# Patient Record
Sex: FEMALE | Race: WHITE | NOT HISPANIC OR LATINO | Employment: OTHER | URBAN - METROPOLITAN AREA
[De-identification: names, ages, dates, MRNs, and addresses within clinical notes are randomized per-mention and may not be internally consistent; named-entity substitution may affect disease eponyms.]

---

## 2016-05-06 LAB — CRC RECOMMENDATION EXT: NORMAL

## 2020-09-27 ENCOUNTER — HOSPITAL ENCOUNTER (OUTPATIENT)
Dept: TELEMEDICINE | Facility: HOSPITAL | Age: 65
Discharge: HOME | End: 2020-09-27
Payer: COMMERCIAL

## 2020-09-27 ENCOUNTER — HOSPITAL ENCOUNTER (INPATIENT)
Facility: HOSPITAL | Age: 65
LOS: 4 days | Discharge: NURSING, REHAB OR LONG-TERM CARE | DRG: 064 | End: 2020-10-01
Attending: PSYCHIATRY & NEUROLOGY | Admitting: PSYCHIATRY & NEUROLOGY
Payer: COMMERCIAL

## 2020-09-27 DIAGNOSIS — I63.9 ISCHEMIC STROKE: ICD-10-CM

## 2020-09-27 DIAGNOSIS — E78.2 MIXED HYPERLIPIDEMIA: ICD-10-CM

## 2020-09-27 DIAGNOSIS — I63.411 EMBOLIC STROKE INVOLVING RIGHT MIDDLE CEREBRAL ARTERY: Primary | ICD-10-CM

## 2020-09-27 DIAGNOSIS — E11.8 CONTROLLED DIABETES MELLITUS TYPE 2 WITH COMPLICATIONS, UNSPECIFIED WHETHER LONG TERM INSULIN USE: ICD-10-CM

## 2020-09-27 PROBLEM — E11.9 DM II (DIABETES MELLITUS, TYPE II), CONTROLLED: Status: ACTIVE | Noted: 2020-09-27

## 2020-09-27 PROBLEM — G93.6 CYTOTOXIC CEREBRAL EDEMA: Status: ACTIVE | Noted: 2020-09-27

## 2020-09-27 PROBLEM — I48.0 PAF (PAROXYSMAL ATRIAL FIBRILLATION): Status: RESOLVED | Noted: 2020-09-27 | Resolved: 2020-09-27

## 2020-09-27 PROBLEM — I48.0 PAF (PAROXYSMAL ATRIAL FIBRILLATION): Status: ACTIVE | Noted: 2020-09-27

## 2020-09-27 LAB
ALBUMIN SERPL BCP-MCNC: 3.5 G/DL (ref 3.5–5.2)
ALP SERPL-CCNC: 110 U/L (ref 55–135)
ALT SERPL W/O P-5'-P-CCNC: 17 U/L (ref 10–44)
ANION GAP SERPL CALC-SCNC: 12 MMOL/L (ref 8–16)
AST SERPL-CCNC: 18 U/L (ref 10–40)
BASOPHILS # BLD AUTO: 0.03 K/UL (ref 0–0.2)
BASOPHILS NFR BLD: 0.3 % (ref 0–1.9)
BILIRUB SERPL-MCNC: 0.6 MG/DL (ref 0.1–1)
BUN SERPL-MCNC: 9 MG/DL (ref 8–23)
CALCIUM SERPL-MCNC: 8.9 MG/DL (ref 8.7–10.5)
CHLORIDE SERPL-SCNC: 108 MMOL/L (ref 95–110)
CHOLEST SERPL-MCNC: 155 MG/DL (ref 120–199)
CHOLEST/HDLC SERPL: 3.4 {RATIO} (ref 2–5)
CK MB SERPL-MCNC: 1 NG/ML (ref 0.1–6.5)
CK MB SERPL-RTO: 1.9 % (ref 0–5)
CK SERPL-CCNC: 54 U/L (ref 20–180)
CO2 SERPL-SCNC: 22 MMOL/L (ref 23–29)
CREAT SERPL-MCNC: 0.7 MG/DL (ref 0.5–1.4)
CTP QC/QA: YES
DIFFERENTIAL METHOD: ABNORMAL
EOSINOPHIL # BLD AUTO: 0 K/UL (ref 0–0.5)
EOSINOPHIL NFR BLD: 0.1 % (ref 0–8)
ERYTHROCYTE [DISTWIDTH] IN BLOOD BY AUTOMATED COUNT: 15.8 % (ref 11.5–14.5)
EST. GFR  (AFRICAN AMERICAN): >60 ML/MIN/1.73 M^2
EST. GFR  (NON AFRICAN AMERICAN): >60 ML/MIN/1.73 M^2
ESTIMATED AVG GLUCOSE: 140 MG/DL (ref 68–131)
GLUCOSE SERPL-MCNC: 146 MG/DL (ref 70–110)
GLUCOSE SERPL-MCNC: 154 MG/DL (ref 70–110)
HBA1C MFR BLD HPLC: 6.5 % (ref 4–5.6)
HCT VFR BLD AUTO: 42.5 % (ref 37–48.5)
HDLC SERPL-MCNC: 45 MG/DL (ref 40–75)
HDLC SERPL: 29 % (ref 20–50)
HGB BLD-MCNC: 12.5 G/DL (ref 12–16)
IMM GRANULOCYTES # BLD AUTO: 0.02 K/UL (ref 0–0.04)
IMM GRANULOCYTES NFR BLD AUTO: 0.2 % (ref 0–0.5)
INR PPP: 0.9 (ref 0.8–1.2)
LDLC SERPL CALC-MCNC: 96.2 MG/DL (ref 63–159)
LYMPHOCYTES # BLD AUTO: 1.3 K/UL (ref 1–4.8)
LYMPHOCYTES NFR BLD: 14.4 % (ref 18–48)
MCH RBC QN AUTO: 25.2 PG (ref 27–31)
MCHC RBC AUTO-ENTMCNC: 29.4 G/DL (ref 32–36)
MCV RBC AUTO: 86 FL (ref 82–98)
MONOCYTES # BLD AUTO: 0.5 K/UL (ref 0.3–1)
MONOCYTES NFR BLD: 5.9 % (ref 4–15)
NEUTROPHILS # BLD AUTO: 7.2 K/UL (ref 1.8–7.7)
NEUTROPHILS NFR BLD: 79.1 % (ref 38–73)
NONHDLC SERPL-MCNC: 110 MG/DL
NRBC BLD-RTO: 0 /100 WBC
PLATELET # BLD AUTO: 230 K/UL (ref 150–350)
PMV BLD AUTO: 11.7 FL (ref 9.2–12.9)
POCT GLUCOSE: 105 MG/DL (ref 70–110)
POCT GLUCOSE: 117 MG/DL (ref 70–110)
POTASSIUM SERPL-SCNC: 4 MMOL/L (ref 3.5–5.1)
PROT SERPL-MCNC: 6.7 G/DL (ref 6–8.4)
PROTHROMBIN TIME: 10.4 SEC (ref 9–12.5)
RBC # BLD AUTO: 4.97 M/UL (ref 4–5.4)
SARS-COV-2 RDRP RESP QL NAA+PROBE: NEGATIVE
SODIUM SERPL-SCNC: 142 MMOL/L (ref 136–145)
TRIGL SERPL-MCNC: 69 MG/DL (ref 30–150)
TROPONIN I SERPL DL<=0.01 NG/ML-MCNC: 0.02 NG/ML (ref 0–0.03)
TSH SERPL DL<=0.005 MIU/L-ACNC: 0.95 UIU/ML (ref 0.4–4)
WBC # BLD AUTO: 9.14 K/UL (ref 3.9–12.7)

## 2020-09-27 PROCEDURE — 82962 GLUCOSE BLOOD TEST: CPT

## 2020-09-27 PROCEDURE — 96374 THER/PROPH/DIAG INJ IV PUSH: CPT

## 2020-09-27 PROCEDURE — 85025 COMPLETE CBC W/AUTO DIFF WBC: CPT

## 2020-09-27 PROCEDURE — 99284 EMERGENCY DEPT VISIT MOD MDM: CPT | Mod: ,,, | Performed by: EMERGENCY MEDICINE

## 2020-09-27 PROCEDURE — 92610 EVALUATE SWALLOWING FUNCTION: CPT

## 2020-09-27 PROCEDURE — 84484 ASSAY OF TROPONIN QUANT: CPT

## 2020-09-27 PROCEDURE — U0002 COVID-19 LAB TEST NON-CDC: HCPCS | Performed by: EMERGENCY MEDICINE

## 2020-09-27 PROCEDURE — 63600175 PHARM REV CODE 636 W HCPCS: Performed by: EMERGENCY MEDICINE

## 2020-09-27 PROCEDURE — 99285 EMERGENCY DEPT VISIT HI MDM: CPT | Mod: 25

## 2020-09-27 PROCEDURE — 25000003 PHARM REV CODE 250: Performed by: NURSE PRACTITIONER

## 2020-09-27 PROCEDURE — 80061 LIPID PANEL: CPT

## 2020-09-27 PROCEDURE — 82550 ASSAY OF CK (CPK): CPT

## 2020-09-27 PROCEDURE — 99223 1ST HOSP IP/OBS HIGH 75: CPT | Mod: ,,, | Performed by: PSYCHIATRY & NEUROLOGY

## 2020-09-27 PROCEDURE — 97166 OT EVAL MOD COMPLEX 45 MIN: CPT

## 2020-09-27 PROCEDURE — 97162 PT EVAL MOD COMPLEX 30 MIN: CPT

## 2020-09-27 PROCEDURE — 97116 GAIT TRAINING THERAPY: CPT

## 2020-09-27 PROCEDURE — 97535 SELF CARE MNGMENT TRAINING: CPT

## 2020-09-27 PROCEDURE — 85610 PROTHROMBIN TIME: CPT

## 2020-09-27 PROCEDURE — 99223 PR INITIAL HOSPITAL CARE,LEVL III: ICD-10-PCS | Mod: ,,, | Performed by: PSYCHIATRY & NEUROLOGY

## 2020-09-27 PROCEDURE — 96376 TX/PRO/DX INJ SAME DRUG ADON: CPT

## 2020-09-27 PROCEDURE — 11000001 HC ACUTE MED/SURG PRIVATE ROOM

## 2020-09-27 PROCEDURE — 82553 CREATINE MB FRACTION: CPT

## 2020-09-27 PROCEDURE — 99284 PR EMERGENCY DEPT VISIT,LEVEL IV: ICD-10-PCS | Mod: ,,, | Performed by: EMERGENCY MEDICINE

## 2020-09-27 PROCEDURE — 84443 ASSAY THYROID STIM HORMONE: CPT

## 2020-09-27 PROCEDURE — 80053 COMPREHEN METABOLIC PANEL: CPT

## 2020-09-27 PROCEDURE — 83036 HEMOGLOBIN GLYCOSYLATED A1C: CPT

## 2020-09-27 PROCEDURE — 97802 MEDICAL NUTRITION INDIV IN: CPT

## 2020-09-27 RX ORDER — BISACODYL 10 MG
10 SUPPOSITORY, RECTAL RECTAL DAILY PRN
Status: DISCONTINUED | OUTPATIENT
Start: 2020-09-27 | End: 2020-10-01 | Stop reason: HOSPADM

## 2020-09-27 RX ORDER — ONDANSETRON 2 MG/ML
INJECTION INTRAMUSCULAR; INTRAVENOUS
Status: DISPENSED
Start: 2020-09-27 | End: 2020-09-27

## 2020-09-27 RX ORDER — IBUPROFEN 200 MG
16 TABLET ORAL
Status: DISCONTINUED | OUTPATIENT
Start: 2020-09-27 | End: 2020-10-01 | Stop reason: HOSPADM

## 2020-09-27 RX ORDER — IBUPROFEN 200 MG
24 TABLET ORAL
Status: DISCONTINUED | OUTPATIENT
Start: 2020-09-27 | End: 2020-10-01 | Stop reason: HOSPADM

## 2020-09-27 RX ORDER — ATORVASTATIN CALCIUM 20 MG/1
40 TABLET, FILM COATED ORAL DAILY
Status: DISCONTINUED | OUTPATIENT
Start: 2020-09-27 | End: 2020-10-01 | Stop reason: HOSPADM

## 2020-09-27 RX ORDER — ONDANSETRON 2 MG/ML
4 INJECTION INTRAMUSCULAR; INTRAVENOUS
Status: COMPLETED | OUTPATIENT
Start: 2020-09-27 | End: 2020-09-27

## 2020-09-27 RX ORDER — ONDANSETRON 8 MG/1
8 TABLET, ORALLY DISINTEGRATING ORAL EVERY 8 HOURS PRN
Status: DISCONTINUED | OUTPATIENT
Start: 2020-09-27 | End: 2020-10-01 | Stop reason: HOSPADM

## 2020-09-27 RX ORDER — SODIUM CHLORIDE 0.9 % (FLUSH) 0.9 %
10 SYRINGE (ML) INJECTION
Status: DISCONTINUED | OUTPATIENT
Start: 2020-09-27 | End: 2020-10-01 | Stop reason: HOSPADM

## 2020-09-27 RX ORDER — INSULIN ASPART 100 [IU]/ML
1-10 INJECTION, SOLUTION INTRAVENOUS; SUBCUTANEOUS
Status: DISCONTINUED | OUTPATIENT
Start: 2020-09-27 | End: 2020-10-01 | Stop reason: HOSPADM

## 2020-09-27 RX ORDER — METOCLOPRAMIDE HYDROCHLORIDE 5 MG/ML
INJECTION INTRAMUSCULAR; INTRAVENOUS
Status: DISPENSED
Start: 2020-09-27 | End: 2020-09-27

## 2020-09-27 RX ORDER — ASPIRIN 81 MG/1
81 TABLET ORAL DAILY
Status: DISCONTINUED | OUTPATIENT
Start: 2020-09-27 | End: 2020-10-01 | Stop reason: HOSPADM

## 2020-09-27 RX ORDER — ONDANSETRON 2 MG/ML
4 INJECTION INTRAMUSCULAR; INTRAVENOUS EVERY 12 HOURS PRN
Status: DISCONTINUED | OUTPATIENT
Start: 2020-09-27 | End: 2020-10-01 | Stop reason: HOSPADM

## 2020-09-27 RX ORDER — GLUCAGON 1 MG
1 KIT INJECTION
Status: DISCONTINUED | OUTPATIENT
Start: 2020-09-27 | End: 2020-10-01 | Stop reason: HOSPADM

## 2020-09-27 RX ORDER — ACETAMINOPHEN 325 MG/1
650 TABLET ORAL EVERY 6 HOURS PRN
Status: DISCONTINUED | OUTPATIENT
Start: 2020-09-27 | End: 2020-10-01 | Stop reason: HOSPADM

## 2020-09-27 RX ORDER — LABETALOL HYDROCHLORIDE 5 MG/ML
10 INJECTION, SOLUTION INTRAVENOUS EVERY 6 HOURS PRN
Status: DISCONTINUED | OUTPATIENT
Start: 2020-09-27 | End: 2020-10-01 | Stop reason: HOSPADM

## 2020-09-27 RX ADMIN — ACETAMINOPHEN 650 MG: 325 TABLET ORAL at 08:09

## 2020-09-27 RX ADMIN — ONDANSETRON 4 MG: 2 INJECTION, SOLUTION INTRAMUSCULAR; INTRAVENOUS at 04:09

## 2020-09-27 RX ADMIN — ONDANSETRON 8 MG: 8 TABLET, ORALLY DISINTEGRATING ORAL at 10:09

## 2020-09-27 RX ADMIN — ATORVASTATIN CALCIUM 40 MG: 20 TABLET, FILM COATED ORAL at 08:09

## 2020-09-27 RX ADMIN — ASPIRIN 81 MG: 81 TABLET, COATED ORAL at 09:09

## 2020-09-27 RX ADMIN — ONDANSETRON 4 MG: 2 INJECTION, SOLUTION INTRAMUSCULAR; INTRAVENOUS at 03:09

## 2020-09-27 NOTE — CONSULTS
[facility]  Vascular Neurology  Comprehensive Stroke Center  Tele-Consultation Note      Consults    Consulting Provider: LUCY BLANCO  Current Providers  No providers found    Patient Location: [facility] - TELEMEDICINE [facility] Emergency Department  Spoke hospital nurse at bedside with patient assisting consultant.     Patient information was obtained from patient and relative(s).         Assessment/Plan:     STROKE DOCUMENTATION     Acute Stroke Times:   Acute Stroke Times   Last Known Normal Date: 09/26/20  Last Known Normal Time: 1500  Stroke Team Arrival Time: 1153  CT Interpretation Time: 0025    NIH Scale:  Interval: baseline  1a. Level of Consciousness: 0-->Alert, keenly responsive  1b. LOC Questions: 0-->Answers both questions correctly  1c. LOC Commands: 0-->Performs both tasks correctly  2. Best Gaze: 0-->Normal  3. Visual: 2-->Complete hemianopia  4. Facial Palsy: 1-->Minor paralysis (flattened nasolabial fold, asymmetry on smiling)  5a. Motor Arm, Left: 1-->Drift, limb holds 90 (or 45) degrees, but drifts down before full 10 seconds, does not hit bed or other support  5b. Motor Arm, Right: 0-->No drift, limb holds 90 (or 45) degrees for full 10 secs  6a. Motor Leg, Left: 1-->Drift, leg falls by the end of the 5-sec period but does not hit bed  6b. Motor Leg, Right: 1-->Drift, leg falls by the end of the 5-sec period but does not hit bed  7. Limb Ataxia: 0-->Absent  8. Sensory: 0-->Normal, no sensory loss  9. Best Language: 0-->No aphasia, normal  10. Dysarthria: 1-->Mild-to-moderate dysarthria, patient slurs at least some words and, at worst, can be understood with some difficulty  11. Extinction and Inattention (formerly Neglect): 0-->No abnormality  Total (NIH Stroke Scale): 7     Modified Viper    [place] Coma Scale:    ABCD2 Score:    [id number] Score:   HAS -BLED Score:   ICH Score:   Hunt & Newell Classification:        Diagnoses:   Ischemic stroke  R MCA ischemic stroke:  Out of tPA window.  CTA performed but images not available.  Call me back if there is an LVO.  Otherwise, recs as bellow:    Antithrombotics for secondary stroke prevention: Antiplatelets: Aspirin: 81 mg daily    Statins for secondary stroke prevention and hyperlipidemia, if present:   Statins: Atorvastatin- 80 mg daily    Aggressive risk factor modification: DM, HLD     Rehab efforts: The patient has been evaluated by a stroke team provider and the therapy needs have been fully considered based off the presenting complaints and exam findings. The following therapy evaluations are needed: PT evaluate and treat, OT evaluate and treat, SLP evaluate and treat    Diagnostics ordered/pending: HgbA1C to assess blood glucose levels, Lipid Profile to assess cholesterol levels, MRI head without contrast to assess brain parenchyma, TTE to assess cardiac function/status     VTE prophylaxis: Enoxaparin 40 mg SQ every 24 hours    BP parameters: Infarct: No intervention, SBP <220    IVFs: NS @ 75cc/hr        There were no vitals taken for this visit.  Alteplase Eligible?: No  Alteplase Recommendation: Alteplase not recommended due to Outside of treatment window   Possible Interventional Revascularization Candidate? Yes    Disposition Recommendation: pending further studies    Subjective:     History of Present Illness:  63 y/o WF with slurred speech and difficulty swallowing with L sided weakness.  Last seen nl at 3p. She was in bed most of the day yesterday.  Noted to have trouble with gait around 7:50p.  She awoke with L sided symptoms later in the evening.       Woke up with symptoms?: yes    Recent bleeding noted: no  Does the patient take any Blood Thinners? no  Medications: No relevant medications      Past Medical History:  diabetes and cervical stenosis;hypothyroidism; PAF    Past Surgical History: no major surgeries within the last 2 weeks    Family History: no  relevant history    Social History: no smoking, no drinking, no drugs    Allergies: Allergies have not been reviewed codeine    Review of Systems   All other systems reviewed and are negative.    Objective:   Vitals: There were no vitals taken for this visit. 167/69    CT READ: Yes  Abnormal CT R MCA infarct; hyperdense RMCA.     Physical Exam  Vitals signs reviewed.               Recommended the emergency room physician to have a brief discussion with the patient and/or family if available regarding the risks and benefits of treatment, and to briefly document the occurrence of that discussion in his clinical encounter note.     The attending portion of this evaluation, treatment, and documentation was performed per Haroldo Maguire MD via audiovisual.    Billing code:  (time dependent stroke, complex case, unstable patient, hemorrhages, any intervention, some mimics)    · This patient has a very critical neurological condition/illness, with very high morbidity and mortality.  · There is a very high probability for acute neurological change leading to clinical and possibly life-threatening deterioration requiring highest level of physician preparedness for urgent intervention.  · There is possibility that this condition will require treatment with high risk medications as quickly as possible.  · There is also a possibility that the patient may benefit from further, more advance complex therapies (e.g. endovascular therapy) that will require prompt diagnosis and care.  · Care was coordinated with other physicians involved in the patient's care.  · Radiologic studies and laboratory data were reviewed and interpreted, and plan of care was re-assessed based on the results.  · Diagnosis, treatment options and prognosis may have been discussed with the patient and/or family members or caregiver.  · Further advanced medical management and further evaluation is warranted for her care.      In your opinion, this was  a: Tier 2 Van Positive    Consult End Time: 12:26 AM     Haroldo Maguire MD  Comprehensive Stroke Center  Vascular Neurology   [facility]

## 2020-09-27 NOTE — H&P
[facility]  Vascular Neurology  Comprehensive Stroke Center  History & Physical    Consults  Assessment/Plan:     * Embolic stroke involving right middle cerebral artery  Su Dent is a 64 y.o. female with significant medical history of HLD, DM II presented to hospital as a transfer from [facility] for evaluation of R MCA stroke.  tPA not administered due to the patient presenting outside of the treatment window.  Imaging at the OSH revealed findings concerning for hyperdense R MCA sign.  On arrival to this facility the patient had dysarthria and right gaze preference.  She was also noted to have a right facial droop and mild right sided drift.  No endovascular intervention at this time.    Antithrombotics for secondary stroke prevention: Antiplatelets: Aspirin: 81 mg daily    Statins for secondary stroke prevention and hyperlipidemia, if present:   Statins: Atorvastatin- 80 mg daily    Aggressive risk factor modification: DM, HLD, thyroid disease     Rehab efforts: The patient has been evaluated by a stroke team provider and the therapy needs have been fully considered based off the presenting complaints and exam findings. The following therapy evaluations are needed: PT evaluate and treat, OT evaluate and treat, SLP evaluate and treat    Diagnostics ordered/pending: Carotid ultrasound to assess vasculature, HgbA1C to assess blood glucose levels, Lipid Profile to assess cholesterol levels, MRI head without contrast to assess brain parenchyma, TTE to assess cardiac function/status , TSH to assess thyroid function    VTE prophylaxis: Heparin 5000 units SQ every 8 hours  Mechanical prophylaxis: Place SCDs    BP parameters: Infarct: No intervention, SBP <220        Cytotoxic cerebral edema  -Small area of cytotoxic cerebral edema identified when reviewing brain imaging in the territory of the R middle cerebral artery. There is no mass effect associated with it. We will continue  to monitor the patients clinical exam for any worsening of symptoms which may indicate expansion of the stroke or the area of the edema resulting in the clinical change. The pattern is suggestive of cardioembolic etiology.        Mixed hyperlipidemia  -Stroke risk factor.  LDL pending.  -Atorvastatin 80 mg daily.    DM II (diabetes mellitus, type II), controlled  -Stroke risk factor.  A1c pending.    -Goal glucose 140-180 for now.  -Hold home meds for now.  -Moderate correction SSI and QAC/HS accuchecks, titrate prn        STROKE DOCUMENTATION     Acute Stroke Times   Symptom Onset Date: 09/26/20  Symptom Onset Time: 2215    NIH Scale:  Interval: baseline  1a. Level of Consciousness: 0-->Alert, keenly responsive  1b. LOC Questions: 0-->Answers both questions correctly  1c. LOC Commands: 0-->Performs both tasks correctly  2. Best Gaze: 1-->Partial gaze palsy, gaze is abnormal in one or both eyes, but forced deviation or total gaze paresis is not present  3. Visual: 0-->No visual loss  4. Facial Palsy: 1-->Minor paralysis (flattened nasolabial fold, asymmetry on smiling)  5a. Motor Arm, Left: 0-->No drift, limb holds 90 (or 45) degrees for full 10 secs  5b. Motor Arm, Right: 1-->Drift, limb holds 90 (or 45) degrees, but drifts down before full 10 secs, does not hit bed or other support  6a. Motor Leg, Left: 0-->No drift, leg holds 30 degree position for full 5 secs  6b. Motor Leg, Right: 1-->Drift, leg falls by the end of the 5-sec period but does not hit bed  7. Limb Ataxia: 0-->Absent  8. Sensory: 0-->Normal, no sensory loss  9. Best Language: 0-->No aphasia, normal  10. Dysarthria: 1-->Mild-to-moderate dysarthria, patient slurs at least some words and, at worst, can be understood with some difficulty  11. Extinction and Inattention (formerly Neglect): 0-->No abnormality  Total (NIH Stroke Scale): 5     Modified [place] Score: 1  Ambrose Coma Scale:15   ABCD2 Score:    [id number] Score:   HAS -BLED Score:   ICH  "Score:   Hunt & Newell Classification:      Thrombolysis Candidate? No, Out of window     Delays to Thrombolysis?  No    Interventional Revascularization Candidate?   Is the patient eligible for mechanical endovascular reperfusion (MITZY)?  No;  Large core infarct    Hemorrhagic change of an Ischemic Stroke: Does this patient have an ischemic stroke with hemorrhagic changes? No         Subjective:     History of Present Illness:  Su Dent is a 64 y.o. female with significant medical history of HLD, DM II presented to hospital as a transfer from [facility] for evaluation of R MCA stroke.  Patient was LKN around 1500 on 09/26/2020.  She acutely developed dysarthria and dysphagia w/left-sided weakness.  The patient states she had chills on Friday night and had a few episodes of N/V and "loose stools" on Saturday prior to symptom onset.  Nothing exacerbated or alleviated her symptoms.  [facility] where a CTH was obtained and revealed findings concerning for hyperdense R MCA.  Telestroke consult was performed by Dr. Maguire.  tPA not administered due to the patient presenting outside of the treatment window.  The patient was transferred to [facility] for higher level of care, possible endovascular intervention.  On arrival to this facility the patient is AA&O x 4.  She is c/o nausea, but denies HA, dizziness, change in vision, CP, or SOB.  On exam the patient was noted to have a right gaze preference and dysarthria as well as a right facial droop and mild right UE/LE drift.  She was taken to CT for CTH which was reviewed by Dr. Dickson.  CT revealed infarcted area in the right MCA territory.  Patient not a candidate for IR intervention at this time.  Will be admitted to Vascular Neurology.          Past Medical History:   Diagnosis Date    Diabetes mellitus     GERD (gastroesophageal reflux disease)     Liver disease     Skin cancer, basal cell     Thyroid disease      Past " "Surgical History:   Procedure Laterality Date    CHOLECYSTECTOMY  05/06/2016    HYSTERECTOMY      JOINT REPLACEMENT Right 11/13/2019    TONSILLECTOMY       Family History   Family history unknown: Yes     Social History     Tobacco Use    Smoking status: Never Smoker    Smokeless tobacco: Never Used   Substance Use Topics    Alcohol use: Yes     Comment: rarely    Drug use: Never     Review of patient's allergies indicates:   Allergen Reactions    Adhesive     Codeine        Medications: I have reviewed the current medication administration record.    No medications prior to admission.       Review of Systems   Constitutional: Positive for chills. Negative for fatigue and fever.   HENT: Negative for drooling and trouble swallowing.    Eyes: Negative for photophobia, pain, discharge and visual disturbance.   Respiratory: Negative for cough, chest tightness, shortness of breath and wheezing.    Cardiovascular: Negative for chest pain, palpitations and leg swelling.   Gastrointestinal: Positive for nausea and vomiting. Negative for abdominal pain, constipation and diarrhea.        "loose stools"     Endocrine: Negative for cold intolerance and heat intolerance.   Genitourinary: Negative for dysuria, frequency, hematuria and urgency.   Musculoskeletal: Negative for gait problem, neck pain and neck stiffness.   Skin: Negative for rash and wound.   Allergic/Immunologic: Negative for environmental allergies and food allergies.   Neurological: Positive for facial asymmetry, speech difficulty and weakness. Negative for dizziness, tremors, light-headedness, numbness and headaches.   Hematological: Negative for adenopathy. Does not bruise/bleed easily.   Psychiatric/Behavioral: Negative for agitation, confusion and hallucinations.     Objective:     Vital Signs (Most Recent):  Temp: 98.6 °F (37 °C) (09/27/20 0309)  Pulse: 75 (09/27/20 0517)  Resp: 18 (09/27/20 0517)  BP: (!) 147/60 (09/27/20 0517)  SpO2: 97 % " (09/27/20 0517)    Vital Signs Range (Last 24H):  Temp:  [98.2 °F (36.8 °C)-98.6 °F (37 °C)]   Pulse:  [61-91]   Resp:  [16-24]   BP: (107-147)/(49-65)   SpO2:  [96 %-98 %]     Physical Exam  Vitals signs and nursing note reviewed.   Constitutional:       General: She is not in acute distress.     Appearance: She is well-developed. She is obese. She is ill-appearing. She is not diaphoretic.   HENT:      Head: Normocephalic and atraumatic.      Right Ear: External ear normal.      Left Ear: External ear normal.      Nose: Nose normal.      Mouth/Throat:      Mouth: Mucous membranes are dry.   Eyes:      General: No scleral icterus.        Right eye: No discharge.         Left eye: No discharge.      Extraocular Movements:      Right eye: Abnormal extraocular motion present.      Left eye: Abnormal extraocular motion present.      Conjunctiva/sclera: Conjunctivae normal.      Pupils: Pupils are equal, round, and reactive to light.   Neck:      Musculoskeletal: Normal range of motion and neck supple.   Cardiovascular:      Rate and Rhythm: Normal rate and regular rhythm.   Pulmonary:      Effort: Pulmonary effort is normal. No respiratory distress.      Breath sounds: Normal breath sounds.   Abdominal:      General: Bowel sounds are decreased. There is no distension.      Palpations: Abdomen is soft.      Tenderness: There is no abdominal tenderness.   Musculoskeletal:      Right lower leg: No edema.      Left lower leg: No edema.   Skin:     General: Skin is warm and dry.      Capillary Refill: Capillary refill takes less than 2 seconds.   Neurological:      Mental Status: She is alert and oriented to person, place, and time.      Motor: Weakness present.         Neurological Exam:   LOC: alert  Attention Span: Good   Articulation: Dysarthria  Orientation: Person, Place, Time   Visual Fields: Full  EOM (CN III, IV, VI): Gaze preference  right  Facial Movement (CN VII): Lower facial weakness on the Right  Sensation:  Intact to light touch, temperature and vibration      Laboratory:  CMP:   Recent Labs   Lab 09/27/20 0418   CALCIUM 8.9   ALBUMIN 3.5   PROT 6.7      K 4.0   CO2 22*      BUN 9   CREATININE 0.7   ALKPHOS 110   ALT 17   AST 18   BILITOT 0.6     CBC:   Recent Labs   Lab 09/27/20 0418   WBC 9.14   RBC 4.97   HGB 12.5   HCT 42.5      MCV 86   MCH 25.2*   MCHC 29.4*     Lipid Panel:   Recent Labs   Lab 09/27/20 0418   CHOL 155   LDLCALC 96.2   HDL 45   TRIG 69     Coagulation:   Recent Labs   Lab 09/27/20 0418   INR 0.9     Hgb A1C:   Recent Labs   Lab 09/27/20 0418   HGBA1C 6.5*     TSH:   Recent Labs   Lab 09/27/20 0418   TSH 0.947       Diagnostic Results:      Brain imaging:  CTH 9/26/2020 (obtained at OSH) Findings suggestive of acute infarct in the right insular region, junction of the right temporal, frontal and parietal lobes and further within the right parietal lobe.  No significant mass effect or midline shift.  No acute intracranial hemorrhage.    CTH 9/27/2020 Hypodensity noted in the right MCA territory    MRI Brain pending    Vessel Imaging:  CTA Head and Neck 9/27/2020 (obtained at OSH) extensive thrombus throughout the right cervical internal carotid artery with trace peripheral flow.  Left internal carotid artery is unremarkable.  Complete occlusion of the majority of the right ICA with mild reconstitution at the carotid terminus.  Complete occlusion of the inferior M2 segment of the right MCA.    Cardiac Evaluation:   TTE pending        Myriam Sanchez, MEREDITH, NP  [facility]  Department of Vascular Neurology   [facility]

## 2020-09-27 NOTE — ASSESSMENT & PLAN NOTE
Su Dent is a 64 y.o. female with significant medical history of HLD, DM II presented to hospital as a transfer from [facility] for evaluation of R MCA stroke.  tPA not administered due to the patient presenting outside of the treatment window.  Imaging at the OSH revealed findings concerning for hyperdense R MCA sign.  On arrival to this facility the patient had dysarthria and right gaze preference.  She was also noted to have a right facial droop and mild right sided drift.  No endovascular intervention at this time.    Antithrombotics for secondary stroke prevention: Antiplatelets: Aspirin: 81 mg daily    Statins for secondary stroke prevention and hyperlipidemia, if present:   Statins: Atorvastatin- 80 mg daily    Aggressive risk factor modification: DM, HLD, thyroid disease     Rehab efforts: The patient has been evaluated by a stroke team provider and the therapy needs have been fully considered based off the presenting complaints and exam findings. The following therapy evaluations are needed: PT evaluate and treat, OT evaluate and treat, SLP evaluate and treat    Diagnostics ordered/pending: Carotid ultrasound to assess vasculature, HgbA1C to assess blood glucose levels, Lipid Profile to assess cholesterol levels, MRI head without contrast to assess brain parenchyma, TTE to assess cardiac function/status , TSH to assess thyroid function    VTE prophylaxis: Heparin 5000 units SQ every 8 hours  Mechanical prophylaxis: Place SCDs    BP parameters: Infarct: No intervention, SBP <220

## 2020-09-27 NOTE — PLAN OF CARE
Problem: Adjustment to Illness (Stroke, Ischemic/Transient Ischemic Attack)  Goal: Optimal Coping  Outcome: Ongoing, Progressing     Problem: Bowel Elimination Impaired (Stroke, Ischemic/Transient Ischemic Attack)  Goal: Effective Bowel Elimination  Outcome: Ongoing, Progressing     Problem: Cerebral Tissue Perfusion Risk (Stroke, Ischemic/Transient Ischemic Attack)  Goal: Optimal Cerebral Tissue Perfusion  Outcome: Ongoing, Progressing     Problem: Communication Impairment (Stroke, Ischemic/Transient Ischemic Attack)  Goal: Improved Communication Skills  Outcome: Ongoing, Progressing

## 2020-09-27 NOTE — PT/OT/SLP EVAL
"Speech Language Pathology Evaluation  Bedside Swallow    Patient Name:  Su Dent   MRN:  [id number]  Admitting Diagnosis: Embolic stroke involving right middle cerebral artery    Recommendations:                 General Recommendations:  Speech language evaluation, Cognitive-linguistic evaluation and monitor diet tolerance  Diet recommendations:  Mechanical soft, Thin   Aspiration Precautions: 1 bite/sip at a time, Alternating bites/sips, Assistance with meals due to fluctuating alertness, Avoid talking while eating, Check for pocketing/oral residue, Feed only when awake/alert, Frequent oral care, HOB to 90 degrees, Meds whole buried in puree, Monitor for s/s of aspiration, Small bites/sips and Strict aspiration precautions   General Precautions: Standard, aspiration, fall, vision impaired  Communication strategies:  provide increased time to answer and go to room if call light pushed    History:     Past Medical History:   Diagnosis Date    Diabetes mellitus     GERD (gastroesophageal reflux disease)     Liver disease     Skin cancer, basal cell     Thyroid disease        Past Surgical History:   Procedure Laterality Date    CHOLECYSTECTOMY  05/06/2016    HYSTERECTOMY      JOINT REPLACEMENT Right 11/13/2019    TONSILLECTOMY       History of Present Illness:  Su Dent is a 64 y.o. female with significant medical history of HLD, DM II presented to hospital as a transfer from [facility] for evaluation of R MCA stroke.  Patient was LKN around 1500 on 09/26/2020.  She acutely developed dysarthria and dysphagia w/left-sided weakness.  The patient states she had chills on Friday night and had a few episodes of N/V and "loose stools" on Saturday prior to symptom onset.  Nothing exacerbated or alleviated her symptoms.  She presented to the [facility] ED where a CTH was obtained and revealed findings concerning for hyperdense R MCA.  Telestroke consult was performed by Dr. Maguire.  tPA not " "administered due to the patient presenting outside of the treatment window.  The patient was transferred to [facility] for higher level of care, possible endovascular intervention.  On arrival to this facility the patient is AA&O x 4.  She is c/o nausea, but denies HA, dizziness, change in vision, CP, or SOB.  On exam the patient was noted to have a right gaze preference and dysarthria as well as a right facial droop and mild right UE/LE drift.  She was taken to CT for CTH which was reviewed by Dr. Dickson.  CT revealed infarcted area in the right MCA territory.  Patient not a candidate for IR intervention at this time.  Will be admitted to Vascular Neurology.     Prior Intubation HX:  None during this admission    Modified Barium Swallow: none on file    Prior diet: BRUNO 19/20. Placed on dental soft diet/thin liquids.     Subjective     "I prefer a straw."     Pain/Comfort:  · Pain Rating 1: 0/10    Objective:     Oral Musculature Evaluation  · Oral Musculature: facial asymmetry present, left weakness  · Dentition: present and adequate  · Secretion Management: adequate  · Mucosal Quality: adequate  · Mandibular Strength and Mobility: WFL  · Oral Labial Strength and Mobility: impaired retraction, impaired seal  · Lingual Strength and Mobility: impaired left lateral movement, impaired anterior elevation, functional strength  · Buccal Strength and Mobility: decreased tone(on left)  · Volitional Cough: adequate  · Volitional Swallow: elicited  · Voice Prior to PO Intake: dry, clear, tense, dysarthria present    Bedside Swallow Eval:   Consistencies Assessed:  · Thin liquids straw sips x 5  · Puree 1/2 tsp x 1, full tsp x 2  · Solids 1/4 cracker x 1     Oral Phase:   · Prolonged mastication  · Oral residue  · Slow oral transit time   · Pt with diminishing alertness during management of solid - cues needed to rouse and complete oral phase of swallow     Pharyngeal Phase:   · no overt clinical signs/symptoms of " aspiration    Compensatory Strategies  · liquid washes to clear oral residue concentrated on left side of oral cavity    Treatment: Education provided to pt regarding role of SLP, purpose of swallow assessment, ruling out aspiration, potential for pocketing, strategies to manage pocketing, diet recommendations, aspiration precautions, oral motor exercises, and initiation of speech/language/cognitive evaluation next session.  Pt expressed understanding, but will benefit from continued reinforcement. White board updated. Nurse notified of recs.     Assessment:     Su Dent is a 64 y.o. female with an SLP diagnosis of Dysphagia.  She marianne presents with dysarthria and right gaze preference.  Speech/language/cognitive evaluation to be conducted.     Goals:   Multidisciplinary Problems     SLP Goals        Problem: SLP Goal    Goal Priority Disciplines Outcome   SLP Goal     SLP Ongoing, Progressing   Description: Speech Language Pathology Goals  Goals expected to be met by 10/5:  1. Pt will tolerate a mechanical soft diet and thin liquids without s/s of aspiration.   2. Pt will participate in ongoing swallowing assessment to determine if/when appropriate for further diet advancement.   3. Pt will perform OME's x 10 to increase oral motor strength and ROM.   4. Pt will participate in speech/language/cognitive evaluation.                            Plan:     · Patient to be seen:  5 x/week   · Plan of Care expires:  10/27/20  · Plan of Care reviewed with:  patient   · SLP Follow-Up:  Yes       Discharge recommendations:  rehabilitation facility     Time Tracking:     SLP Treatment Date:   09/27/20  Speech Start Time:  1136  Speech Stop Time:  1149     Speech Total Time (min):  13 min    Billable Minutes: Eval Swallow and Oral Function 13    ALONZO Corrigan, CCC-SLP  09/27/2020     ALONZO Corrigan, CCC-SLP  Speech Language Pathologist  [phone]  9/27/2020

## 2020-09-27 NOTE — PLAN OF CARE
[facility] rehab at this time. TIMA Clark 9/27/2020   Problem: Occupational Therapy Goal  Goal: Occupational Therapy Goal  Description: Goals to be met by: 10/4     Patient will increase functional independence with ADLs by performing:    UE Dressing while seated EOB with Minimal Assistance.  LE Dressing (pants, brief) with Minimal Assistance.  Grooming while standing at sink with Minimal Assistance.  Toileting from toilet with Minimal Assistance for hygiene and clothing management.   Toilet transfer to toilet with Minimal Assistance.  Functional mobility at household distance with min(A).    Outcome: Ongoing, Progressing

## 2020-09-27 NOTE — HPI
"Su Dent is a 64 y.o. female with significant medical history of HLD, DM II presented to hospital as a transfer from [facility] for evaluation of R MCA stroke.  Patient was LKN around 1500 on 09/26/2020.  She acutely developed dysarthria and dysphagia w/left-sided weakness.  The patient states she had chills on Friday night and had a few episodes of N/V and "loose stools" on Saturday prior to symptom onset.  Nothing exacerbated or alleviated her symptoms.  She presented to the [facility] ED where a CTH was obtained and revealed findings concerning for hyperdense R MCA.  Telestroke consult was performed by Dr. Maguire.  tPA not administered due to the patient presenting outside of the treatment window.  The patient was transferred to [facility] for higher level of care, possible endovascular intervention.  On arrival to this facility the patient is AA&O x 4.  She is c/o nausea, but denies HA, dizziness, change in vision, CP, or SOB.  On exam the patient was noted to have a right gaze preference and dysarthria as well as a right facial droop and mild right UE/LE drift.  She was taken to CT for CTH which was reviewed by Dr. Dickson.  CT revealed infarcted area in the right MCA territory.  Patient not a candidate for IR intervention at this time.  Will be admitted to Vascular Neurology.    "

## 2020-09-27 NOTE — CONSULTS
Food & Nutrition  Education    Diet Education: stroke   Time Spent: 1 minute  Learners: patient    Nutrition Education provided with handouts: stroke nutrition therapy    Comments:  Pt sleeping at time of visit. Unable to discuss nutrition education. Provided handouts at bedside to review with pt on follow up.     All questions and concerns answered. Dietitian's contact information provided.       Follow-Up: 10/2/20    Please Re-consult as needed    Thanks!  Heather Pyle RD, LDN

## 2020-09-27 NOTE — ED NOTES
MRI complete, pt tolerated exam well, pt transferred back to [facility]. Telemetry reapplied and awaiting transport back to room.                               HR                          79                          SpO2                          97% RA                           BP

## 2020-09-27 NOTE — SUBJECTIVE & OBJECTIVE
"    Past Medical History:   Diagnosis Date    Diabetes mellitus     GERD (gastroesophageal reflux disease)     Liver disease     Skin cancer, basal cell     Thyroid disease      Past Surgical History:   Procedure Laterality Date    CHOLECYSTECTOMY  05/06/2016    HYSTERECTOMY      JOINT REPLACEMENT Right 11/13/2019    TONSILLECTOMY       Family History   Family history unknown: Yes     Social History     Tobacco Use    Smoking status: Never Smoker    Smokeless tobacco: Never Used   Substance Use Topics    Alcohol use: Yes     Comment: rarely    Drug use: Never     Review of patient's allergies indicates:   Allergen Reactions    Adhesive     Codeine        Medications: I have reviewed the current medication administration record.    No medications prior to admission.       Review of Systems   Constitutional: Positive for chills. Negative for fatigue and fever.   HENT: Negative for drooling and trouble swallowing.    Eyes: Negative for photophobia, pain, discharge and visual disturbance.   Respiratory: Negative for cough, chest tightness, shortness of breath and wheezing.    Cardiovascular: Negative for chest pain, palpitations and leg swelling.   Gastrointestinal: Positive for nausea and vomiting. Negative for abdominal pain, constipation and diarrhea.        "loose stools"     Endocrine: Negative for cold intolerance and heat intolerance.   Genitourinary: Negative for dysuria, frequency, hematuria and urgency.   Musculoskeletal: Negative for gait problem, neck pain and neck stiffness.   Skin: Negative for rash and wound.   Allergic/Immunologic: Negative for environmental allergies and food allergies.   Neurological: Positive for facial asymmetry, speech difficulty and weakness. Negative for dizziness, tremors, light-headedness, numbness and headaches.   Hematological: Negative for adenopathy. Does not bruise/bleed easily.   Psychiatric/Behavioral: Negative for agitation, confusion and hallucinations. "     Objective:     Vital Signs (Most Recent):  Temp: 98.6 °F (37 °C) (09/27/20 0309)  Pulse: 75 (09/27/20 0517)  Resp: 18 (09/27/20 0517)  BP: (!) 147/60 (09/27/20 0517)  SpO2: 97 % (09/27/20 0517)    Vital Signs Range (Last 24H):  Temp:  [98.2 °F (36.8 °C)-98.6 °F (37 °C)]   Pulse:  [61-91]   Resp:  [16-24]   BP: (107-147)/(49-65)   SpO2:  [96 %-98 %]     Physical Exam  Vitals signs and nursing note reviewed.   Constitutional:       General: She is not in acute distress.     Appearance: She is well-developed. She is obese. She is ill-appearing. She is not diaphoretic.   HENT:      Head: Normocephalic and atraumatic.      Right Ear: External ear normal.      Left Ear: External ear normal.      Nose: Nose normal.      Mouth/Throat:      Mouth: Mucous membranes are dry.   Eyes:      General: No scleral icterus.        Right eye: No discharge.         Left eye: No discharge.      Extraocular Movements:      Right eye: Abnormal extraocular motion present.      Left eye: Abnormal extraocular motion present.      Conjunctiva/sclera: Conjunctivae normal.      Pupils: Pupils are equal, round, and reactive to light.   Neck:      Musculoskeletal: Normal range of motion and neck supple.   Cardiovascular:      Rate and Rhythm: Normal rate and regular rhythm.   Pulmonary:      Effort: Pulmonary effort is normal. No respiratory distress.      Breath sounds: Normal breath sounds.   Abdominal:      General: Bowel sounds are decreased. There is no distension.      Palpations: Abdomen is soft.      Tenderness: There is no abdominal tenderness.   Musculoskeletal:      Right lower leg: No edema.      Left lower leg: No edema.   Skin:     General: Skin is warm and dry.      Capillary Refill: Capillary refill takes less than 2 seconds.   Neurological:      Mental Status: She is alert and oriented to person, place, and time.      Motor: Weakness present.         Neurological Exam:   LOC: alert  Attention Span: Good   Articulation:  Dysarthria  Orientation: Person, Place, Time   Visual Fields: Full  EOM (CN III, IV, VI): Gaze preference  right  Facial Movement (CN VII): Lower facial weakness on the Right  Sensation: Intact to light touch, temperature and vibration      Laboratory:  CMP:   Recent Labs   Lab 09/27/20 0418   CALCIUM 8.9   ALBUMIN 3.5   PROT 6.7      K 4.0   CO2 22*      BUN 9   CREATININE 0.7   ALKPHOS 110   ALT 17   AST 18   BILITOT 0.6     CBC:   Recent Labs   Lab 09/27/20 0418   WBC 9.14   RBC 4.97   HGB 12.5   HCT 42.5      MCV 86   MCH 25.2*   MCHC 29.4*     Lipid Panel:   Recent Labs   Lab 09/27/20 0418   CHOL 155   LDLCALC 96.2   HDL 45   TRIG 69     Coagulation:   Recent Labs   Lab 09/27/20 0418   INR 0.9     Hgb A1C:   Recent Labs   Lab 09/27/20 0418   HGBA1C 6.5*     TSH:   Recent Labs   Lab 09/27/20 0418   TSH 0.947       Diagnostic Results:      Brain imaging:  CTH 9/26/2020 (obtained at OSH) Findings suggestive of acute infarct in the right insular region, junction of the right temporal, frontal and parietal lobes and further within the right parietal lobe.  No significant mass effect or midline shift.  No acute intracranial hemorrhage.    CTH 9/27/2020 Hypodensity noted in the right MCA territory    MRI Brain pending    Vessel Imaging:  CTA Head and Neck 9/27/2020 (obtained at OSH) extensive thrombus throughout the right cervical internal carotid artery with trace peripheral flow.  Left internal carotid artery is unremarkable.  Complete occlusion of the majority of the right ICA with mild reconstitution at the carotid terminus.  Complete occlusion of the inferior M2 segment of the right MCA.    Cardiac Evaluation:   TTE pending

## 2020-09-27 NOTE — ED TRIAGE NOTES
Pt transferred from [place] via [facility] for Neuro consultand possible IR. Pt last seen normal at 1500 26 Sept 20 and presented with left facial droop, slurred speech and left sided weakness. Pt was not a TPA candidate due to out of time window.

## 2020-09-27 NOTE — ASSESSMENT & PLAN NOTE
-Stroke risk factor.  A1c pending.    -Goal glucose 140-180 for now.  -Hold home meds for now.  -Moderate correction SSI and QAC/HS accuchecks, titrate prn

## 2020-09-27 NOTE — PT/OT/SLP EVAL
Occupational Therapy   Evaluation    Name: Su Dent  MRN: [id number]  Admitting Diagnosis:  Embolic stroke involving right middle cerebral artery      Recommendations:     Discharge Recommendations: rehabilitation facility  Discharge Equipment Recommendations:  bedside commode, walker, rolling  Barriers to discharge:  Decreased caregiver support    Assessment:     Su Dent is a 64 y.o. female with a medical diagnosis of Embolic stroke involving right middle cerebral artery.  She presents with L hemiparesis with motor and sensory deficits, L inattention, and overall decline in insight into current functional deficits. Moreover, she demo a significant decline in her functional indep with all prior roles and routines. Despite patient's co-morbidities, patient was living in community setting functioning at independent level for ADLs, and mobility prior to this event.  There is an expectation of returning to prior level of function to maintain independence thus avoiding readmission.  Patient's clinical condition meets full Inpatient Rehab (IPR) criteria; including the ability to actively participate in 3 hours of therapy.  A lower level of care(SNF) cannot provide the interdisciplinary treatment approach needed.    . Performance deficits affecting function: weakness, impaired endurance, impaired sensation, impaired self care skills, impaired functional mobilty, gait instability, impaired balance, decreased upper extremity function, decreased lower extremity function, impaired fine motor, impaired skin, visual deficits.      Rehab Prognosis: Good; patient would benefit from acute skilled OT services to address these deficits and reach maximum level of function.       Plan:     Patient to be seen 4 x/week to address the above listed problems via self-care/home management, therapeutic activities, therapeutic exercises, neuromuscular re-education, cognitive retraining  · Plan of Care Expires: 10/26/20  · Plan  of Care Reviewed with: patient    Subjective     Chief Complaint: fatigue and nausea  Patient/Family Comments/goals: to feel better    Occupational Profile:  Living Environment: Pt lives alone in [facility] with 0 SAMINA. Walk in shower with seat.   Previous level of function: active and indep. Wears glasses. 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 Driving.   Roles and Routines: mother, care taker to self, home and community dweller, employee,    Equipment Used at Home:  none; owns cane but was not using   Assistance upon Discharge: yes- reported 2 sons, but limited 24 hour    Pain/Comfort:  · Pain Rating 1: 0/10(c/o nausea)  · Pain Rating Post-Intervention 1: 0/10    Patients cultural, spiritual, Scientology conflicts given the current situation: no    Objective:     Communicated with: RN prior to session. Completed with PT.  Patient found HOB elevated with telemetry, blood pressure cuff, peripheral IV, PureWick upon OT entry to room.    General Precautions: Standard, aspiration, fall, diabetic   Orthopedic Precautions:N/A   Braces: N/A     Occupational Performance:    Bed Mobility:    · Patient completed Rolling/Turning to Right with minimum assistance  · Patient completed Supine to Sit with minimum assistance    Functional Mobility/Transfers:  · Patient completed Sit <> Stand Transfer with moderate assistance  with  hand-held assist   · Patient completed Bed <> Chair Transfer using Step Transfer technique with moderate assistance with hand-held assist  · Patient completed Toilet Transfer Step Transfer technique with moderate assistance with  hand-held assist  · Functional Mobility: mod(A) and unilateral hand held assistance for mobility to bathroom    Activities of Daily Living:  · Grooming: minimum assistance standing at sink for hand hygiene and oral care. encouraged L UE weight bearing with functional tasks.   · Upper Body Dressing: moderate assistance EOB to don gown as jacket; edu on jeimy  "technique  · Lower Body Dressing: maximal assistance B socks EOB  · Toileting: minimum assistance perineal care from toilet    Cognitive/Visual Perceptual:  Cognitive/Psychosocial Skills:     -       Oriented to: Person, Place and Situation ; "Its still Sunday?!"  -       Follows Commands/attention:Follows two-step commands  -       Communication: dysarthria  -       Memory: No Deficits noted  -       Safety awareness/insight to disability: impaired   -       Mood/Affect/Coping skills/emotional control: Cooperative  Visual/Perceptual:      -Impaired  L inattention      Physical Exam:  Postural examination/scapula alignment:    -       Rounded shoulders  -       Forward head  Skin integrity: Visible skin intact  Edema:  None noted  Sensation:    -       Impaired  light/touch L UE and proprioception L UE  Motor Planning:    -       impaired L UE  Dominant hand:    -       R  Upper Extremity Range of Motion:     -       Right Upper Extremity: WFL  -       Left Upper Extremity: AAROM WFL  Upper Extremity Strength:    -       Right Upper Extremity: WFL  -       Left Upper Extremity: 3+/5   Strength:    -       Right Upper Extremity: WFL  -       Left Upper Extremity: 3+/5; poor sustained  Fine Motor Coordination:    -       Impaired  L  Gross motor coordination:   L hemiplegia/paresis    AMPAC 6 Click ADL:  AMPAC Total Score: 18   Body mass index is 36.17 kg/m².  Vitals:    09/27/20 1206   BP: (!) 142/65   Pulse: 71   Resp: 17   Temp: 97.7 °F (36.5 °C)       Treatment & Education:  -Pt alert and agreeable to therapy session; edu on OT role in care; light turned on and shade open for session  -EOB with CGA for static and simple dynamic ; requiring mod cues and facilitation for L UE in weight bearing throughout   -t/f to chair<>pt reported need to void<>mobility to bathroom for toileting and grooming at sink as reported above<>increased report of nausea<>bedside chair brought to outside of bathroom by PT<>return to " room<>MD present  -edu on call light for staff assistance  -edu on importance of activity and mobility with staff assistance  -Communication board updated; questions/concerns addressed within OT scope of practice    Education:    Patient left up in chair with all lines intact, call button in reach and MD present    GOALS:   Multidisciplinary Problems     Occupational Therapy Goals        Problem: Occupational Therapy Goal    Goal Priority Disciplines Outcome Interventions   Occupational Therapy Goal     OT, PT/OT Ongoing, Progressing    Description: Goals to be met by: 10/4     Patient will increase functional independence with ADLs by performing:    UE Dressing while seated EOB with Minimal Assistance.  LE Dressing (pants, brief) with Minimal Assistance.  Grooming while standing at sink with Minimal Assistance.  Toileting from toilet with Minimal Assistance for hygiene and clothing management.   Toilet transfer to toilet with Minimal Assistance.  Functional mobility at household distance with min(A).                     History:     Past Medical History:   Diagnosis Date    Diabetes mellitus     GERD (gastroesophageal reflux disease)     Liver disease     Skin cancer, basal cell     Thyroid disease        Past Surgical History:   Procedure Laterality Date    CHOLECYSTECTOMY  05/06/2016    HYSTERECTOMY      JOINT REPLACEMENT Right 11/13/2019    TONSILLECTOMY         Time Tracking:     OT Date of Treatment: 09/27/20  OT Start Time: 0943  OT Stop Time: 1011  OT Total Time (min): 28 min    Billable Minutes:Evaluation 15  Self Care/Home Management 8    TIMA Clark  9/27/2020

## 2020-09-27 NOTE — ASSESSMENT & PLAN NOTE
-Small area of cytotoxic cerebral edema identified when reviewing brain imaging in the territory of the R middle cerebral artery. There is no mass effect associated with it. We will continue to monitor the patients clinical exam for any worsening of symptoms which may indicate expansion of the stroke or the area of the edema resulting in the clinical change. The pattern is suggestive of cardioembolic etiology.

## 2020-09-27 NOTE — PLAN OF CARE
Plan of Care:  Discharge Recommendation: Rehab.  Please continue Progressive Mobility Protocol as appropriate.  Appropriate transfer level with nursing staff: Safe to transfer to bedside chair/bedside commode: stand pivot with 1 person without RW.    Radha Romero PT, DPT  2020  Pager: [phone]    Physical Therapy Treatment Goals:  Multidisciplinary Problems       Physical Therapy Goals          Problem: Physical Therapy Goal    Goal Priority Disciplines Outcome Goal Variances Interventions   Physical Therapy Goal     PT, PT/OT Ongoing, Progressing     Description: Goals to be met by: 10/4/2020    Patient will increase functional independence with mobility by performin. Supine <> sit with Supervision.  2. Sit <> stand transfer with Contact Guard Assistance using No Assistive Device and Rolling Walker.  3. Bed <> chair transfer via Stand Pivot with Contact Guard Assistance using No Assistive Device and Rolling Walker.  4. Gait  x 50 feet with Contact Guard Assistance using No Assistive Device and Rolling Walker to prepare for community ambulation and endurance activities.  5. Dynamic standing for 8 minutes with Contact Guard Assistance using No Assistive Device to prepare for functional tasks in standing.  6. Able to tolerate exercise for 15-20 reps with independence.

## 2020-09-27 NOTE — ASSESSMENT & PLAN NOTE
R MCA ischemic stroke:  Out of tPA window.  CTA performed but images not available.  Call me back if there is an LVO.  Otherwise, recs as bellow:    Antithrombotics for secondary stroke prevention: Antiplatelets: Aspirin: 81 mg daily    Statins for secondary stroke prevention and hyperlipidemia, if present:   Statins: Atorvastatin- 80 mg daily    Aggressive risk factor modification: DM, HLD     Rehab efforts: The patient has been evaluated by a stroke team provider and the therapy needs have been fully considered based off the presenting complaints and exam findings. The following therapy evaluations are needed: PT evaluate and treat, OT evaluate and treat, SLP evaluate and treat    Diagnostics ordered/pending: HgbA1C to assess blood glucose levels, Lipid Profile to assess cholesterol levels, MRI head without contrast to assess brain parenchyma, TTE to assess cardiac function/status     VTE prophylaxis: Enoxaparin 40 mg SQ every 24 hours    BP parameters: Infarct: No intervention, SBP <220    IVFs: NS @ 75cc/hr

## 2020-09-27 NOTE — SUBJECTIVE & OBJECTIVE
Woke up with symptoms?: yes    Recent bleeding noted: no  Does the patient take any Blood Thinners? no  Medications: No relevant medications      Past Medical History:  diabetes and cervical stenosis;hypothyroidism; PAF    Past Surgical History: no major surgeries within the last 2 weeks    Family History: no relevant history    Social History: no smoking, no drinking, no drugs    Allergies: Allergies have not been reviewed codeine    Review of Systems   All other systems reviewed and are negative.    Objective:   Vitals: There were no vitals taken for this visit. 167/69    CT READ: Yes  Abnormal CT R MCA infarct; hyperdense RMCA.     Physical Exam  Vitals signs reviewed.

## 2020-09-27 NOTE — ED NOTES
Telemetry Verification   Patient placed on Telemetry Box  Verified with War Room  Box # [id number]   Monitor Tech    Rate 80   Rhythm NSR

## 2020-09-27 NOTE — NURSING
Took report on pt from Lewis LERMA RN from the ED at 0553. The pt went to MRI prior to arriving on the NPU unit. The pt arrived on the NPU unit at 0620. The pt was oriented to the unit. VS were taken and are as follows:   /68, 98.9 Temp., 96% O2, 77 heart rate, and 98 MAP. Will report this information to the oncoming day shift nurse. Will monitor pt accordingly.

## 2020-09-27 NOTE — CONSULTS
Inpatient consult to Physical Medicine Rehab  Consult performed by: Shelia Kennedy NP  Consult ordered by: Myriam Sanchez, MEREDITH, NP  Reason for consult: Assess rehab needs      Reviewed patient history and current admission.  Rehab team following.  Full consult to follow.    KEVON Juarez, FNP-C  Physical Medicine & Rehabilitation   09/27/2020  Spectralink: [phone]

## 2020-09-27 NOTE — ED NOTES
Pt arrived to MRI and transferred to MRI table without difficulty. MRI safe cardiac monitor and O2 monitor applied to pt. No acute distress noted. Will continue to monitor pt and to monitor vital signs during duration of exam.                                        HR                                 80                                    SpO2                                 97% RA                                    BP

## 2020-09-27 NOTE — HPI
65 y/o WF with slurred speech and difficulty swallowing with L sided weakness.  Last seen nl at 3p. She was in bed most of the day yesterday.  Noted to have trouble with gait around 7:50p.  She awoke with L sided symptoms later in the evening.

## 2020-09-27 NOTE — PLAN OF CARE
Problem: SLP Goal  Goal: SLP Goal  Description: Speech Language Pathology Goals  Goals expected to be met by 10/5:  1. Pt will tolerate a mechanical soft diet and thin liquids without s/s of aspiration.   2. Pt will participate in ongoing swallowing assessment to determine if/when appropriate for further diet advancement.   3. Pt will perform OME's x 10 to increase oral motor strength and ROM.   4. Pt will participate in speech/language/cognitive evaluation.           Outcome: Ongoing, Progressing  Bedside swallow study completed.  Recommending mechanical soft diet and thin liquids at this time. Close watch on pocketing with need for compensatory strategies to manage pocketing.  OME's to be performed. SLC eval next session.   ALONZO Corrigan, CCC-SLP  Speech Language Pathologist  [phone]  9/27/2020

## 2020-09-28 LAB
ALBUMIN SERPL BCP-MCNC: 3.4 G/DL (ref 3.5–5.2)
ALP SERPL-CCNC: 109 U/L (ref 55–135)
ALT SERPL W/O P-5'-P-CCNC: 14 U/L (ref 10–44)
ANION GAP SERPL CALC-SCNC: 11 MMOL/L (ref 8–16)
APTT BLDCRRT: 25.5 SEC (ref 21–32)
ASCENDING AORTA: 2.91 CM
AST SERPL-CCNC: 14 U/L (ref 10–40)
AV INDEX (PROSTH): 0.79
AV MEAN GRADIENT: 7 MMHG
AV PEAK GRADIENT: 14 MMHG
AV VALVE AREA: 2.97 CM2
AV VELOCITY RATIO: 0.72
BASOPHILS # BLD AUTO: 0.03 K/UL (ref 0–0.2)
BASOPHILS NFR BLD: 0.4 % (ref 0–1.9)
BILIRUB SERPL-MCNC: 0.6 MG/DL (ref 0.1–1)
BSA FOR ECHO PROCEDURE: 2.12 M2
BUN SERPL-MCNC: 10 MG/DL (ref 8–23)
CALCIUM SERPL-MCNC: 8.6 MG/DL (ref 8.7–10.5)
CHLORIDE SERPL-SCNC: 106 MMOL/L (ref 95–110)
CO2 SERPL-SCNC: 24 MMOL/L (ref 23–29)
CREAT SERPL-MCNC: 0.7 MG/DL (ref 0.5–1.4)
CV ECHO LV RWT: 0.56 CM
DIFFERENTIAL METHOD: ABNORMAL
DOP CALC AO PEAK VEL: 1.86 M/S
DOP CALC AO VTI: 38.53 CM
DOP CALC LVOT AREA: 3.8 CM2
DOP CALC LVOT DIAMETER: 2.19 CM
DOP CALC LVOT PEAK VEL: 1.34 M/S
DOP CALC LVOT STROKE VOLUME: 114.38 CM3
DOP CALCLVOT PEAK VEL VTI: 30.38 CM
E WAVE DECELERATION TIME: 297.11 MSEC
E/A RATIO: 1.07
E/E' RATIO: 17.5 M/S
ECHO LV POSTERIOR WALL: 0.94 CM (ref 0.6–1.1)
EOSINOPHIL # BLD AUTO: 0.1 K/UL (ref 0–0.5)
EOSINOPHIL NFR BLD: 1.2 % (ref 0–8)
ERYTHROCYTE [DISTWIDTH] IN BLOOD BY AUTOMATED COUNT: 15.6 % (ref 11.5–14.5)
EST. GFR  (AFRICAN AMERICAN): >60 ML/MIN/1.73 M^2
EST. GFR  (NON AFRICAN AMERICAN): >60 ML/MIN/1.73 M^2
FRACTIONAL SHORTENING: 20 % (ref 28–44)
GLUCOSE SERPL-MCNC: 125 MG/DL (ref 70–110)
HCT VFR BLD AUTO: 43.4 % (ref 37–48.5)
HGB BLD-MCNC: 13.1 G/DL (ref 12–16)
IMM GRANULOCYTES # BLD AUTO: 0.02 K/UL (ref 0–0.04)
IMM GRANULOCYTES NFR BLD AUTO: 0.3 % (ref 0–0.5)
INR PPP: 1 (ref 0.8–1.2)
INTERVENTRICULAR SEPTUM: 1.9 CM (ref 0.6–1.1)
LA MAJOR: 6.35 CM
LA MINOR: 5.79 CM
LA WIDTH: 4.1 CM
LEFT ATRIUM SIZE: 4.7 CM
LEFT ATRIUM VOLUME INDEX: 48.4 ML/M2
LEFT ATRIUM VOLUME: 99.21 CM3
LEFT INTERNAL DIMENSION IN SYSTOLE: 2.71 CM (ref 2.1–4)
LEFT VENTRICLE DIASTOLIC VOLUME INDEX: 22.65 ML/M2
LEFT VENTRICLE DIASTOLIC VOLUME: 46.39 ML
LEFT VENTRICLE MASS INDEX: 82 G/M2
LEFT VENTRICLE SYSTOLIC VOLUME INDEX: 13.3 ML/M2
LEFT VENTRICLE SYSTOLIC VOLUME: 27.16 ML
LEFT VENTRICULAR INTERNAL DIMENSION IN DIASTOLE: 3.37 CM (ref 3.5–6)
LEFT VENTRICULAR MASS: 168.01 G
LV LATERAL E/E' RATIO: 17.5 M/S
LV SEPTAL E/E' RATIO: 17.5 M/S
LYMPHOCYTES # BLD AUTO: 1.9 K/UL (ref 1–4.8)
LYMPHOCYTES NFR BLD: 26.2 % (ref 18–48)
MAGNESIUM SERPL-MCNC: 1.8 MG/DL (ref 1.6–2.6)
MCH RBC QN AUTO: 25.7 PG (ref 27–31)
MCHC RBC AUTO-ENTMCNC: 30.2 G/DL (ref 32–36)
MCV RBC AUTO: 85 FL (ref 82–98)
MONOCYTES # BLD AUTO: 0.7 K/UL (ref 0.3–1)
MONOCYTES NFR BLD: 9 % (ref 4–15)
MV PEAK A VEL: 0.98 M/S
MV PEAK E VEL: 1.05 M/S
NEUTROPHILS # BLD AUTO: 4.7 K/UL (ref 1.8–7.7)
NEUTROPHILS NFR BLD: 62.9 % (ref 38–73)
NRBC BLD-RTO: 0 /100 WBC
PHOSPHATE SERPL-MCNC: 3 MG/DL (ref 2.7–4.5)
PLATELET # BLD AUTO: 237 K/UL (ref 150–350)
PMV BLD AUTO: 11.8 FL (ref 9.2–12.9)
POCT GLUCOSE: 106 MG/DL (ref 70–110)
POCT GLUCOSE: 118 MG/DL (ref 70–110)
POCT GLUCOSE: 146 MG/DL (ref 70–110)
POCT GLUCOSE: 150 MG/DL (ref 70–110)
POTASSIUM SERPL-SCNC: 3.4 MMOL/L (ref 3.5–5.1)
PROT SERPL-MCNC: 6.6 G/DL (ref 6–8.4)
PROTHROMBIN TIME: 10.9 SEC (ref 9–12.5)
RA MAJOR: 4.46 CM
RA PRESSURE: 3 MMHG
RA WIDTH: 2.37 CM
RBC # BLD AUTO: 5.1 M/UL (ref 4–5.4)
RIGHT VENTRICULAR END-DIASTOLIC DIMENSION: 3.01 CM
RV TISSUE DOPPLER FREE WALL SYSTOLIC VELOCITY 1 (APICAL 4 CHAMBER VIEW): 10.41 CM/S
SINUS: 2.87 CM
SODIUM SERPL-SCNC: 141 MMOL/L (ref 136–145)
STJ: 2.8 CM
TDI LATERAL: 0.06 M/S
TDI SEPTAL: 0.06 M/S
TDI: 0.06 M/S
TRICUSPID ANNULAR PLANE SYSTOLIC EXCURSION: 2.24 CM
WBC # BLD AUTO: 7.41 K/UL (ref 3.9–12.7)

## 2020-09-28 PROCEDURE — 97110 THERAPEUTIC EXERCISES: CPT

## 2020-09-28 PROCEDURE — 99233 SBSQ HOSP IP/OBS HIGH 50: CPT | Mod: ,,, | Performed by: PSYCHIATRY & NEUROLOGY

## 2020-09-28 PROCEDURE — 84100 ASSAY OF PHOSPHORUS: CPT

## 2020-09-28 PROCEDURE — 25000242 PHARM REV CODE 250 ALT 637 W/ HCPCS: Performed by: NURSE PRACTITIONER

## 2020-09-28 PROCEDURE — 83735 ASSAY OF MAGNESIUM: CPT

## 2020-09-28 PROCEDURE — 25000003 PHARM REV CODE 250: Performed by: NURSE PRACTITIONER

## 2020-09-28 PROCEDURE — 85730 THROMBOPLASTIN TIME PARTIAL: CPT

## 2020-09-28 PROCEDURE — 99233 PR SUBSEQUENT HOSPITAL CARE,LEVL III: ICD-10-PCS | Mod: ,,, | Performed by: PSYCHIATRY & NEUROLOGY

## 2020-09-28 PROCEDURE — 11000001 HC ACUTE MED/SURG PRIVATE ROOM

## 2020-09-28 PROCEDURE — 97535 SELF CARE MNGMENT TRAINING: CPT

## 2020-09-28 PROCEDURE — 36415 COLL VENOUS BLD VENIPUNCTURE: CPT

## 2020-09-28 PROCEDURE — 85610 PROTHROMBIN TIME: CPT

## 2020-09-28 PROCEDURE — 63600175 PHARM REV CODE 636 W HCPCS: Performed by: STUDENT IN AN ORGANIZED HEALTH CARE EDUCATION/TRAINING PROGRAM

## 2020-09-28 PROCEDURE — 25000003 PHARM REV CODE 250: Performed by: STUDENT IN AN ORGANIZED HEALTH CARE EDUCATION/TRAINING PROGRAM

## 2020-09-28 PROCEDURE — 85025 COMPLETE CBC W/AUTO DIFF WBC: CPT

## 2020-09-28 PROCEDURE — 92523 SPEECH SOUND LANG COMPREHEN: CPT

## 2020-09-28 PROCEDURE — 80053 COMPREHEN METABOLIC PANEL: CPT

## 2020-09-28 RX ORDER — ENOXAPARIN SODIUM 100 MG/ML
40 INJECTION SUBCUTANEOUS EVERY 24 HOURS
Status: DISCONTINUED | OUTPATIENT
Start: 2020-09-28 | End: 2020-09-28

## 2020-09-28 RX ORDER — HEPARIN SODIUM 5000 [USP'U]/ML
5000 INJECTION, SOLUTION INTRAVENOUS; SUBCUTANEOUS EVERY 8 HOURS
Status: DISCONTINUED | OUTPATIENT
Start: 2020-09-28 | End: 2020-09-30

## 2020-09-28 RX ORDER — METOPROLOL TARTRATE 25 MG/1
12.5 TABLET ORAL 2 TIMES DAILY
Status: DISCONTINUED | OUTPATIENT
Start: 2020-09-28 | End: 2020-10-01 | Stop reason: HOSPADM

## 2020-09-28 RX ORDER — POTASSIUM CHLORIDE 1.5 G/1.58G
20 POWDER, FOR SOLUTION ORAL ONCE
Status: COMPLETED | OUTPATIENT
Start: 2020-09-28 | End: 2020-09-28

## 2020-09-28 RX ADMIN — HEPARIN SODIUM 5000 UNITS: 5000 INJECTION INTRAVENOUS; SUBCUTANEOUS at 10:09

## 2020-09-28 RX ADMIN — ACETAMINOPHEN 650 MG: 325 TABLET ORAL at 04:09

## 2020-09-28 RX ADMIN — POTASSIUM CHLORIDE 20 MEQ: 1.5 POWDER, FOR SOLUTION ORAL at 08:09

## 2020-09-28 RX ADMIN — ASPIRIN 81 MG: 81 TABLET, COATED ORAL at 08:09

## 2020-09-28 RX ADMIN — METOPROLOL TARTRATE 12.5 MG: 25 TABLET, FILM COATED ORAL at 09:09

## 2020-09-28 RX ADMIN — ATORVASTATIN CALCIUM 40 MG: 20 TABLET, FILM COATED ORAL at 08:09

## 2020-09-28 NOTE — NURSING
POC reviewed with pt. Pt's questions and concerns were addressed. Medications reviewed in completion with pt. No distress noted. VS WDL. Pt call light, bed in lowest and locked position, and bed alarm on. Pt's family member and personal belongings at the bedside.

## 2020-09-28 NOTE — PT/OT/SLP PROGRESS
"Occupational Therapy   Treatment    Name: Su Dent  MRN: [id number]  Admitting Diagnosis:  Embolic stroke involving right middle cerebral artery       Recommendations:     Discharge Recommendations: rehabilitation facility  Discharge Equipment Recommendations:  bedside commode, walker, rolling  Barriers to discharge:  Other (Comment)(lives alone, assistance required)    Assessment:     Su Dent is a 64 y.o. female with a medical diagnosis of Embolic stroke involving right middle cerebral artery.  She presents with agreeable to afternoon therapy session; however, more easily fatigable than yesterday. She is still limited in safety awareness 2* L side inattention and impaired sensation and strength. Performance deficits affecting engagement in work, home mgmt, self-care, and prior roles and routines are weakness, impaired endurance, impaired sensation, impaired self care skills, impaired functional mobilty, gait instability, impaired balance, decreased coordination, decreased upper extremity function, decreased lower extremity function, decreased safety awareness, impaired skin, impaired fine motor, impaired cognition, visual deficits. She would best benefit from continued acute skilled OT services while awaiting placement for IPR to address these deficits.    Rehab Prognosis:  Fair; patient would benefit from acute skilled OT services to address these deficits and reach maximum level of function.       Plan:     Patient to be seen 4 x/week to address the above listed problems via self-care/home management, therapeutic activities, therapeutic exercises, neuromuscular re-education, cognitive retraining  · Plan of Care Expires: 10/26/20  · Plan of Care Reviewed with: patient, son    Subjective   "I have 6 grandkids. They come over every weekend"    Pain/Comfort:  · Pain Rating 1: 0/10    Objective:     Communicated with: RN prior to session.  Patient found supine with son in room and lights off and curtain " closed with telemetry, peripheral IV upon OT entry to room.    General Precautions: Standard, aspiration, fall, vision impaired   Orthopedic Precautions:N/A   Braces: N/A     Occupational Performance:     Bed Mobility:    · Patient completed Rolling/Turning to Right with moderate assistance  · Patient completed Supine to Sit with moderate assistance     Functional Mobility/Transfers:  · Patient completed Sit <> Stand Transfer with minimum assistance  with  hand-held assist   · Patient completed Toilet Transfer Step Transfer technique with minimum assistance with  hand-held assist, bedside commode and impulsive tendency to sit before fully aligned with BSC  · Functional Mobility: sit>stand after BSC use c min(A); pt fatigued quickly to max(A) and required bedside chair be brought to her    Activities of Daily Living:  · Grooming, seated in chair: minimum assistance for assistance to reach back of head and c verbal cueing to attend to L side  · Lower Body Dressing: maximal assistance to don socks, pt able to kick out but unable to bend 2* prior knee injury  · Toileting: total assistance - pt claimed she could not perform toilet hygiene/perineal care, BSC      [facility] 6 Click ADL: 14    Treatment & Education:  -Pt and son edu on sleep hygiene; pt re edu/son edu on OT role in acute setting  -pt edu on importance of orientation to L UE  -pt performed bed mobility with mod(A) and functional mobility with min(A) during today's session  -pt performed hair grooming task with R UE c min(A) and cueing to attend to L side  -AAROM performed for L UE shoulder flexion and abduction/horizontal abduction  -PROM performed for L UE elbow flexion/extension, sup/pron, wrist flexion/extension    Patient left up in chair with L UE propped on pillow with call button in reach and SLP presentEducation:      GOALS:   Multidisciplinary Problems     Occupational Therapy Goals        Problem: Occupational Therapy Goal    Goal Priority Disciplines  Outcome Interventions   Occupational Therapy Goal     OT, PT/OT Ongoing, Progressing    Description: Goals to be met by: 10/4     Patient will increase functional independence with ADLs by performing:    UE Dressing while seated EOB with Minimal Assistance.  LE Dressing (pants, brief) with Minimal Assistance.  Grooming while standing at sink with Minimal Assistance.  Toileting from toilet with Minimal Assistance for hygiene and clothing management.   Toilet transfer to toilet with Minimal Assistance.  Functional mobility at household distance with min(A).                     Time Tracking:     OT Date of Treatment: 09/28/20  OT Start Time: 1427  OT Stop Time: 1500  OT Total Time (min): 33 min    Billable Minutes:Self Care/Home Management 20  Therapeutic Exercise 8    YUDITH Vanegas  9/28/2020

## 2020-09-28 NOTE — PLAN OF CARE
Problem: SLP Goal  Goal: SLP Goal  Description: Speech Language Pathology Goals  Goals expected to be met by 10/5:  1. Pt will tolerate a mechanical soft diet and thin liquids without s/s of aspiration.   2. Pt will participate in ongoing swallowing assessment to determine if/when appropriate for further diet advancement.   3. Pt will perform OME's x 10 to increase oral motor strength and ROM.   4. Pt will participate in speech/language/cognitive evaluation. Goal met/initiated 9/28  ADDITIONAL GOALS:  4. Pt will recall 3/3 motor speech strategies.  5. Pt will utilize motor speech strategies during connected speech to improve speech intelligibility to 90% given min cues.   6. Pt will participate in evaluation of reading, writing, visual spatial, and functional math abilities.         Outcome: Ongoing, Progressing  Speech/language/cognitive evaluation completed. Need to formally assess reading, writing, visual spatial, and functional math abilities.  Oral dysphagia, dysarthria, left neglect, and decreased insight evident.  ALONZO Corrigan, CCC-SLP  Speech Language Pathologist  [phone]  9/28/2020

## 2020-09-28 NOTE — PLAN OF CARE
Pt limited in progressing towards goals today 2* fatigue.  Problem: Occupational Therapy Goal  Goal: Occupational Therapy Goal  Description: Goals to be met by: 10/4     Patient will increase functional independence with ADLs by performing:    UE Dressing while seated EOB with Minimal Assistance.  LE Dressing (pants, brief) with Minimal Assistance.  Grooming while standing at sink with Minimal Assistance.  Toileting from toilet with Minimal Assistance for hygiene and clothing management.   Toilet transfer to toilet with Minimal Assistance.  Functional mobility at household distance with min(A).    Outcome: Ongoing, Progressing     Zeinab Stark  9/28/2020

## 2020-09-28 NOTE — NURSING
At the end of the night shift; upon reporting to the day nurse Donna. The pt has not encountered another loose stool. Pt is resting with niece (Rupal) at the bedside. Will continue to monitor pt accordingly.

## 2020-09-28 NOTE — ASSESSMENT & PLAN NOTE
Su Dent is a 64 y.o. female with significant medical history of HLD, DM II presented to hospital as a transfer from [facility] for evaluation of R MCA stroke.  tPA not administered due to the patient presenting outside of the treatment window.  Imaging at the OSH revealed findings concerning for hyperdense R MCA sign.  On arrival to this facility the patient had dysarthria and right gaze preference.  She was also noted to have a right facial droop and mild right sided drift.  No endovascular intervention at this time.    Etiology: Cardioembolic     TTE showing 70% EF (Grade II diastolic dysfunction) with severe left atrial enlargement.     Antithrombotics for secondary stroke prevention: Antiplatelets: Aspirin: 81 mg daily However, TTE showing severe left atrial enlargement which is concerning for possible atrial fibrillation.     Statins for secondary stroke prevention and hyperlipidemia, if present:   Statins: Atorvastatin- 80 mg daily    Aggressive risk factor modification: DM, HLD, thyroid disease     Rehab efforts: The patient has been evaluated by a stroke team provider and the therapy needs have been fully considered based off the presenting complaints and exam findings. The following therapy evaluations are needed: PT evaluate and treat, OT evaluate and treat, SLP evaluate and treat    Diagnostics ordered/pending: Carotid ultrasound to assess vasculature    VTE prophylaxis: Heparin 5000 units SQ every 8 hours    BP parameters: Infarct: No intervention, SBP <220

## 2020-09-28 NOTE — PLAN OF CARE
09/28/20 1556   Post-Acute Status   Post-Acute Authorization Placement   Post-Acute Placement Status Referrals Sent   Discharge Delays None known at this time   Discharge Plan   Discharge Plan A Rehab   Discharge Plan B Home with family;Home Health     IRF referral sent to [facility].    Gabrielle Villa RN  Case Management  Ext: [phone]

## 2020-09-28 NOTE — ED PROVIDER NOTES
Encounter Date: 9/27/2020       History     Chief Complaint   Patient presents with    Stroke Transfer     Pt transported from [place] for Neuro consult and possible IR     64-year-old past medical history of type 2 diabetes, hyperlipidemia transferred from [facility] for evaluation of stroke right MCA.  After presenting with dysarthria in right gaze preference.  Outside of treatment window did not receive tPA.  On arrival patient mentions symptoms have improved.  In terms of speech.  Denies any additional focal numbness, weakness, tingling.                    Review of patient's allergies indicates:   Allergen Reactions    Adhesive     Codeine      Past Medical History:   Diagnosis Date    Diabetes mellitus     GERD (gastroesophageal reflux disease)     Liver disease     Skin cancer, basal cell     Thyroid disease      Past Surgical History:   Procedure Laterality Date    CHOLECYSTECTOMY  05/06/2016    HYSTERECTOMY      JOINT REPLACEMENT Right 11/13/2019    TONSILLECTOMY       Family History   Family history unknown: Yes     Social History     Tobacco Use    Smoking status: Never Smoker    Smokeless tobacco: Never Used   Substance Use Topics    Alcohol use: Yes     Comment: rarely    Drug use: Never     Review of Systems   Constitutional: Negative for fever.   HENT: Negative for congestion.    Respiratory: Negative for cough and shortness of breath.    Cardiovascular: Negative for chest pain.   Gastrointestinal: Negative for abdominal pain and nausea.   Genitourinary: Negative for dysuria.   Skin: Negative for color change.   Neurological: Positive for facial asymmetry. Negative for dizziness.        Dysarthria        Physical Exam     Initial Vitals [09/27/20 0309]   BP Pulse Resp Temp SpO2   (!) 124/57 91 18 98.6 °F (37 °C) 97 %      MAP       --         Physical Exam    Constitutional: She appears well-developed and well-nourished. She is not diaphoretic. No distress.   HENT:    Head: Normocephalic and atraumatic.   Eyes: Conjunctivae and EOM are normal. Pupils are equal, round, and reactive to light. Right eye exhibits no discharge. Left eye exhibits no discharge. No scleral icterus.   Neck: Normal range of motion. Neck supple.   Cardiovascular: Normal rate and regular rhythm. Exam reveals no friction rub.    No murmur heard.  Pulmonary/Chest: Breath sounds normal. No respiratory distress. She has no wheezes. She has no rales.   Abdominal: Soft. Bowel sounds are normal. She exhibits no distension. There is no abdominal tenderness. There is no rebound and no guarding.   Musculoskeletal: Normal range of motion.   Neurological: She is alert and oriented to person, place, and time. A cranial nerve deficit is present. No sensory deficit. GCS score is 15. GCS eye subscore is 4. GCS verbal subscore is 5. GCS motor subscore is 6.   right facial droop    Skin: Skin is warm and dry. No erythema. No pallor.         ED Course   Procedures  Labs Reviewed   HEMOGLOBIN A1C - Abnormal; Notable for the following components:       Result Value    Hemoglobin A1C 6.5 (*)     Estimated Avg Glucose 140 (*)     All other components within normal limits   CBC W/ AUTO DIFFERENTIAL - Abnormal; Notable for the following components:    Mean Corpuscular Hemoglobin 25.2 (*)     Mean Corpuscular Hemoglobin Conc 29.4 (*)     RDW 15.8 (*)     Gran% 79.1 (*)     Lymph% 14.4 (*)     All other components within normal limits   PROTIME-INR   TSH   CK-MB   TROPONIN I   SARS-COV-2 RDRP GENE          Imaging Results           MRI BRAIN WITHOUT CONTRAST (Final result)  Result time 09/27/20 06:40:14    Final result by Jeffery Billy MD (09/27/20 06:40:14)                 Impression:      Multiple areas of diffusion signal abnormality involving the right cerebral hemisphere, consistent with acute ischemia/infarct as discussed above.    Gradient signal abnormality along the course of the right MCA likely relates to right MCA  thrombosis.    Asymmetric signal associated with the horizontal portion of the right internal carotid artery may relate to slow or diminished flow.    This report was flagged in Epic as abnormal.      Electronically signed by: Jeffery Billy  Date:    09/27/2020  Time:    06:40             Narrative:    EXAMINATION:  MRI BRAIN WITHOUT CONTRAST    CLINICAL HISTORY:  Stroke, follow up;    TECHNIQUE:  Multiplanar multisequence MR imaging of the brain was performed without contrast.    COMPARISON:  CT examination of the brain September 27, 2020, 311 hours    FINDINGS:  MRI examination of the brain was performed.  Intravenous contrast was not utilized.  There are areas of abnormal diffusion signal hyperintensity involving the right cerebral hemisphere, prominent involvement along the mid to posterior perisylvian region on the right, right MCA distribution, extending posteriorly and superiorly along the right frontal parietal region, with extension superiorly along the right parietal lobe superiorly posteriorly, to include areas of linear and curvilinear as well as focal areas of signal abnormality.  Additional areas of signal abnormality are seen involving the right frontal lobe anteriorly and superiorly and extending to the vertex.  There is involvement of the right caudate, to include the anterior aspect of the caudate body and head, there is suspected involvement of the anterior limb of the internal capsule on the right.  Additional small focus right parietooccipital location.  These areas demonstrate diminished signal intensity on ADC imaging and are consistent with areas of acute ischemia/infarct.  In addition on the gradient echo imaging, there is prominent diminished signal intensity along the course of the MCA on the right, likely relating to thrombus of the right MCA.  On axial T2 imaging there is asymmetric increased signal intensity along the horizontal segment of the internal carotid artery on the right, at  the skull base, this may relate to slow or diminished flow of the right internal carotid artery.    In addition to the aforementioned findings chronic appearing changes are noted, chronic appearing white matter changes are noted.  There is no evidence for intracranial mass, mass effect or midline shift.  There is no hydrocephalus.  Appropriate CSF spaces are seen at the skull base.  The intracranial venous sinuses appear appropriate.    The upper cervical cord, brainstem and craniocervical junction appear appropriate.  The patient appears right vertebral dominant.  Mild paranasal sinus mucosal thickening is noted.  The orbits appear intact.                                CT Head Without Contrast (Final result)  Result time 09/27/20 07:29:27    Final result by Kyle Delgadillo DO (09/27/20 07:29:27)                 Impression:      Right MCA territory acute infarction with a hyperdense right MCA sign compatible with thrombosis.    This report was flagged in Epic as abnormal.      Electronically signed by: Kyle Delgadillo  Date:    09/27/2020  Time:    07:29             Narrative:    EXAMINATION:  CT HEAD WITHOUT CONTRAST    CLINICAL HISTORY:  Neuro deficit, acute, stroke suspected;    TECHNIQUE:  Low dose axial CT images obtained throughout the head without intravenous contrast. Sagittal and coronal reconstructions were performed.    COMPARISON:  Correlation is made to subsequently performed MRI of the brain.    FINDINGS:  There are hypodensities and loss of gray-white differentiation within the right MCA distribution, compatible with acute infarctions.  These correspond with areas of restricted diffusion on subsequently performed MRI.  There is a hyperdensity within the right MCA along its course within the sylvian fissure compatible with thrombosis.  Ventricles and sulci are normal in size for age without evidence of hydrocephalus. No extra-axial blood or fluid collections. No parenchymal mass or hemorrhage.    No  calvarial fracture.  The scalp is unremarkable.  Bilateral paranasal sinuses and mastoid air cells are clear.                                 Medical Decision Making:   History:   Old Medical Records: I decided to obtain old medical records.  Initial Assessment:   64-year-old past medical history of type 2 diabetes, hyperlipidemia transferred from [facility] for evaluation of stroke right MCA. VSS. PE noted for mild rigth facial droop, dysarthria resovled.   Differential Diagnosis:   Ddx includes stroke, TIA, electorlyte derangement. todds paralysis.   Clinical Tests:   Lab Tests: Ordered and Reviewed  Radiological Study: Ordered and Reviewed  ED Management:  Plan: stroke consult, CT head, labs, and reassess, admissiont.                              Clinical Impression:       ICD-10-CM ICD-9-CM   1. Embolic stroke involving right middle cerebral artery  I63.411 434.11                          ED Disposition Condition    Admit                             Kayla Olsen Jr., MD  09/28/20 1753

## 2020-09-28 NOTE — HOSPITAL COURSE
TTE showing 70% EF (Grade II diastolic dysfunction) with severe left atrial enlargement. MRI w/o contrast showing multiple areas of diffusion signal abnormality involving the right cerebral hemisphere. TCD showing emboli count in the R:20 and emboli count in the left 109. Will start patient on apixiban given her severe atrial enlargement. Nephew is cardiologist and he will arrange for a 30 day cardiac monitoring after rehab. Patient accepted to rehab in [place].

## 2020-09-28 NOTE — PLAN OF CARE
CM met with patient and son in room for Discharge Planning Assessment.  Per patient,  patient lives with self in a(n) SSH with 0 steps to enter.   Per patient, patient was independent with ADLS and used no DME for ambulation.  Per patient, the patient will have assistance from sons upon discharge.  Discharge Plan A IRF and Discharge Plan B Home with HH.  Discharge Planning Booklet given to patient/family and discussed.  All questions addressed.    Diabetic used glucometer at home.     PCP:  Aris Youngblood MD      Pharmacy:    [facility] [place] - [address]  [address] [facility] [address]  Phone: [phone] Fax: [phone]        Emergency Contacts:  Extended Emergency Contact Information  Primary Emergency Contact: Reggie Dent (BELINDA)  Mobile Phone: [phone]  Relation: Son  Secondary Emergency Contact: Onofre Dent  Mobile Phone: [phone]  Relation: Son      Insurance:    Payor: / 09/28/20 1406   Discharge Assessment   Assessment Type Discharge Planning Assessment   Confirmed/corrected address and phone number on facesheet? Yes   Assessment information obtained from? Patient   Expected Length of Stay (days) 5   Communicated expected length of stay with patient/caregiver yes   Prior to hospitilization cognitive status: Alert/Oriented;No Deficits   Prior to hospitalization functional status: Independent   Current cognitive status: Alert/Oriented   Current Functional Status: Assistive Equipment;Needs Assistance   Facility Arrived From: [place]   Lives With alone   Able to Return to Prior Arrangements other (see comments)  (maximo)   Is patient able to care for self after discharge? Unable to determine at this time (comments)   Who are your caregiver(s) and their phone number(s)? Reggie ANGEL) Filipe son [phone]   Patient's perception of discharge disposition rehab facility   Readmission Within the Last 30 Days no previous admission in last 30 days   Patient  currently being followed by outpatient case management? No   Patient currently receives any other outside agency services? No  (previously used [facility])   Equipment Currently Used at Home none   Part D Coverage na   Do you have any problems affording any of your prescribed medications? TBD   Is the patient taking medications as prescribed? yes   Does the patient have transportation home? Yes   Transportation Anticipated family or friend will provide  (son)   Dialysis Name and Scheduled days na   Does the patient receive services at the [facility]mad[facility] Clinic? No   Discharge Plan A Rehab   Discharge Plan B Home with family;Home Health   DME Needed Upon Discharge  none   Patient/Family in Agreement with Plan yes       Gabrielle Villa RN  Case Management  Ext: [phone]

## 2020-09-28 NOTE — PLAN OF CARE
Problem: Hemodynamic Instability (Stroke, Ischemic/Transient Ischemic Attack)  Goal: Vital Signs Remain in Desired Range  Outcome: Ongoing, Progressing

## 2020-09-28 NOTE — PT/OT/SLP EVAL
"Speech Language Pathology Evaluation  Cognitive Communication    Patient Name:  Su Dent   MRN:  [id number]  Admitting Diagnosis: Embolic stroke involving right middle cerebral artery    Recommendations:     Recommendations:                General Recommendations:  Dysphagia therapy, Speech/language therapy and Cognitive-linguistic therapy  Diet recommendations:  Mechanical soft, Thin   Aspiration Precautions: 1 bite/sip at a time, Alternating bites/sips, Avoid talking while eating, Check for pocketing/oral residue, Feed only when awake/alert, HOB to 90 degrees, Monitor for s/s of aspiration, Small bites/sips and Strict aspiration precautions   General Precautions: Standard, aspiration, fall, vision impaired(left neglect)  Communication strategies:  encourage use of motor speech strategies (loud, slow, over-articulate)    History:     Past Medical History:   Diagnosis Date    Diabetes mellitus     GERD (gastroesophageal reflux disease)     Liver disease     Skin cancer, basal cell     Thyroid disease        Past Surgical History:   Procedure Laterality Date    CHOLECYSTECTOMY  05/06/2016    HYSTERECTOMY      JOINT REPLACEMENT Right 11/13/2019    TONSILLECTOMY         Social History: Patient lives alone.  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  Pt drives, manages medications and finances and was completely IND PTA.     Subjective     "I've gotta lay down. I'm tired and weak."  "But I gotta rest."    Pain/Comfort:  · Pain Rating 1: 0/10    Objective:   Cognitive Status:    Pt O x 4. General recall, immediate recall, and delayed recall were intact (100%).  Pt provided solutions to problems and generated multiple causes for hypothetical problems with good ability.  Supervision was required to complete a 4 word progression task.      Receptive Language:   Comprehension:   Pt answered complex yes/no q's and followed 2- & 3-step commands with 100% accuracy.    Pragmatics:  "   flat affect; decreased eye contact    Expressive Language:  Verbal:    Pt able to express basic and more complex thoughts and ideas. A word fluency task was attempted, but pt stopped task after 33 seconds stating she needed to lay down because she was tired and weak.  In 33 seconds, pt listed 9 category members.       Motor Speech:  Dysarthria mild to moderate    Voice:   tense    Visual-Spatial:  Left Neglect evident, but not yet formally assessed    Reading:   to be assessed     Written Expression:   to be assessed    Treatment: Education provided to pt regarding continued use of strategies to manage potential pocketing of PO, OME's, cognitive evaluation, increasing endurance, increasing attention to the left, and SLP treatment plan and POC.  Pt demonstrating understanding, but will need continued reinforcement, especially for increasing endurance and attention to left.     Assessment:   Su Dent is a 64 y.o. female with an SLP diagnosis of Dysphagia, Dysarthria, Cognitive-Linguistic Impairment and Visio-Spatial Impairment.     Goals:   Multidisciplinary Problems     SLP Goals        Problem: SLP Goal    Goal Priority Disciplines Outcome   SLP Goal     SLP Ongoing, Progressing   Description: Speech Language Pathology Goals  Goals expected to be met by 10/5:  1. Pt will tolerate a mechanical soft diet and thin liquids without s/s of aspiration.   2. Pt will participate in ongoing swallowing assessment to determine if/when appropriate for further diet advancement.   3. Pt will perform OME's x 10 to increase oral motor strength and ROM.   4. Pt will participate in speech/language/cognitive evaluation. Goal met/initiated 9/28  ADDITIONAL GOALS:  4. Pt will recall 3/3 motor speech strategies.  5. Pt will utilize motor speech strategies during connected speech to improve speech intelligibility to 90% given min cues.   6. Pt will participate in evaluation of reading, writing, visual spatial, and functional math  abilities.                            Plan:   · Patient to be seen:  5 x/week   · Plan of Care expires:  10/27/20  · Plan of Care reviewed with:  patient   · SLP Follow-Up:  Yes       Discharge recommendations:  Discharge Facility/Level of Care Needs: rehabilitation facility     Time Tracking:   SLP Treatment Date:   09/28/20  Speech Start Time:  1455  Speech Stop Time:  1511     Speech Total Time (min):  16 min    Billable Minutes: Eval 8  and [facility] Care/Home Management Training 8    ALONZO Corrigan, CCC-SLP  09/28/2020       ALONZO Corrigan, CCC-SLP  Speech Language Pathologist  [phone]  9/28/2020

## 2020-09-28 NOTE — ASSESSMENT & PLAN NOTE
-Stroke risk factor.    -Goal glucose 140-180 for now.  -Hold home meds for now.  -Moderate correction SSI and QAC/HS accuchecks, titrate prn

## 2020-09-28 NOTE — SUBJECTIVE & OBJECTIVE
Neurologic Chief Complaint: Right MCA stroke ( dysarthia, right gaze preference and a right facial droop)    Subjective:     Interval History: Patient is seen for follow-up neurological assessment and treatment recommendations:     Multiple areas of diffusion signal abnormality involving the right cerebral hemisphere    HPI, Past Medical, Family, and Social History remains the same as documented in the initial encounter.     Review of Systems  Scheduled Meds:   aspirin  81 mg Oral Daily    atorvastatin  40 mg Oral Daily    heparin (porcine)  5,000 Units Subcutaneous Q8H    psyllium husk (aspartame)  1 packet Oral Daily     Continuous Infusions:   sodium chloride 0.9%       PRN Meds:acetaminophen, bisacodyL, dextrose 50%, dextrose 50%, glucagon (human recombinant), glucose, glucose, insulin aspart U-100, labetaloL, ondansetron, ondansetron, sodium chloride 0.9%, sodium chloride 0.9%    Objective:     Vital Signs (Most Recent):  Temp: 99.1 °F (37.3 °C) (09/28/20 1200)  Pulse: 78 (09/28/20 1200)  Resp: 18 (09/28/20 1200)  BP: (!) 142/65 (09/28/20 1200)  SpO2: 99 % (09/28/20 1200)  BP Location: Left arm    Vital Signs Range (Last 24H):  Temp:  [97 °F (36.1 °C)-99.1 °F (37.3 °C)]   Pulse:  []   Resp:  [16-18]   BP: (134-152)/(57-70)   SpO2:  [95 %-99 %]   BP Location: Left arm    Physical Exam  Vitals signs and nursing note reviewed.   Constitutional:       General: She is not in acute distress.     Appearance: She is well-developed. She is obese. She is ill-appearing. She is not diaphoretic.   HENT:      Head: Normocephalic and atraumatic.      Right Ear: External ear normal.      Left Ear: External ear normal.      Nose: Nose normal.      Mouth/Throat:      Mouth: Mucous membranes are dry.   Eyes:      General: No scleral icterus.        Right eye: No discharge.         Left eye: No discharge.      Extraocular Movements:      Right eye: Abnormal extraocular motion present.      Left eye: Abnormal extraocular  motion present.      Conjunctiva/sclera: Conjunctivae normal.      Pupils: Pupils are equal, round, and reactive to light.   Neck:      Musculoskeletal: Normal range of motion and neck supple.   Cardiovascular:      Rate and Rhythm: Normal rate and regular rhythm.   Pulmonary:      Effort: Pulmonary effort is normal. No respiratory distress.      Breath sounds: Normal breath sounds.   Abdominal:      General: Bowel sounds are decreased. There is no distension.      Palpations: Abdomen is soft.      Tenderness: There is no abdominal tenderness.   Musculoskeletal:      Right lower leg: No edema.      Left lower leg: No edema.   Skin:     General: Skin is warm and dry.      Capillary Refill: Capillary refill takes less than 2 seconds.   Neurological:      Mental Status: She is alert and oriented to person, place, and time.      Motor: Weakness present.         Neurological Exam:   LOC: alert  Attention Span: Good   Language: No aphasia  Articulation: No dysarthria  Orientation: Person, Place, Time   Visual Fields: Full  EOM (CN III, IV, VI): Full/intact  Pupils (CN II, III): PERRL  Facial Sensation (CN V): Normal  Facial Movement (CN VII): Lower facial weakness on the Left  Gag Reflex: present  Reflexes: 2+ throughout  Motor: Arm left  Paresis: 3/5  Leg left  Paresis: 4/5  Arm right  Paresis: 4/5  Leg right Paresis: 4/5  Cebellar: Axia/Trunk Instability/Ataxia   Sensation: Intact to light touch, temperature and vibration  Tone: Normal tone throughout    Laboratory:  CMP:   Recent Labs   Lab 09/28/20  0353   CALCIUM 8.6*   ALBUMIN 3.4*   PROT 6.6      K 3.4*   CO2 24      BUN 10   CREATININE 0.7   ALKPHOS 109   ALT 14   AST 14   BILITOT 0.6     BMP:   Recent Labs   Lab 09/28/20  0353      K 3.4*      CO2 24   BUN 10   CREATININE 0.7   CALCIUM 8.6*     CBC:   Recent Labs   Lab 09/28/20  0353   WBC 7.41   RBC 5.10   HGB 13.1   HCT 43.4      MCV 85   MCH 25.7*   MCHC 30.2*     Lipid Panel:    Recent Labs   Lab 09/27/20  0418   CHOL 155   LDLCALC 96.2   HDL 45   TRIG 69     Coagulation:   Recent Labs   Lab 09/28/20  0353   INR 1.0   APTT 25.5     Platelet Aggregation Study: No results for input(s): PLTAGG, PLTAGINTERP, PLTAGREGLACO, ADPPLTAGGREG in the last 168 hours.  Hgb A1C:   Recent Labs   Lab 09/27/20 0418   HGBA1C 6.5*     TSH:   Recent Labs   Lab 09/27/20 0418   TSH 0.947       Diagnostic Results     Brain imaging:  CTH 9/26/2020 (obtained at OSH) Findings suggestive of acute infarct in the right insular region, junction of the right temporal, frontal and parietal lobes and further within the right parietal lobe.  No significant mass effect or midline shift.  No acute intracranial hemorrhage.     CTH 9/27/2020 Hypodensity noted in the right MCA territory     MRI Brain      Multiple areas of diffusion signal abnormality involving the right cerebral hemisphere, consistent with acute ischemia/infarct as discussed above.    Vessel Imaging:  CTA Head and Neck 9/27/2020 (obtained at OSH) extensive thrombus throughout the right cervical internal carotid artery with trace peripheral flow.  Left internal carotid artery is unremarkable.  Complete occlusion of the majority of the right ICA with mild reconstitution at the carotid terminus.  Complete occlusion of the inferior M2 segment of the right MCA.     Cardiac Evaluation:   TTE   70% EF (Grade II diastolic dysfunction) with severe left atrial enlargement.

## 2020-09-28 NOTE — PROGRESS NOTES
[facility]  Vascular Neurology  Comprehensive Stroke Center  Progress Note    Assessment/Plan:     * Embolic stroke involving right middle cerebral artery  Su Dent is a 64 y.o. female with significant medical history of HLD, DM II presented to hospital as a transfer from [facility] for evaluation of R MCA stroke.  tPA not administered due to the patient presenting outside of the treatment window.  Imaging at the OSH revealed findings concerning for hyperdense R MCA sign.  On arrival to this facility the patient had dysarthria and right gaze preference.  She was also noted to have a right facial droop and mild right sided drift.  No endovascular intervention at this time.    Etiology: Cardioembolic     TTE showing 70% EF (Grade II diastolic dysfunction) with severe left atrial enlargement.     Antithrombotics for secondary stroke prevention: Antiplatelets: Aspirin: 81 mg daily However, TTE showing severe left atrial enlargement which is concerning for possible atrial fibrillation.     Statins for secondary stroke prevention and hyperlipidemia, if present:   Statins: Atorvastatin- 80 mg daily    Aggressive risk factor modification: DM, HLD, thyroid disease     Rehab efforts: The patient has been evaluated by a stroke team provider and the therapy needs have been fully considered based off the presenting complaints and exam findings. The following therapy evaluations are needed: PT evaluate and treat, OT evaluate and treat, SLP evaluate and treat    Diagnostics ordered/pending: Carotid ultrasound to assess vasculature    VTE prophylaxis: Heparin 5000 units SQ every 8 hours    BP parameters: Infarct: No intervention, SBP <220        Cytotoxic cerebral edema  -Small area of cytotoxic cerebral edema identified when reviewing brain imaging in the territory of the R middle cerebral artery. There is no mass effect associated with it. We will continue to monitor the patients clinical  exam for any worsening of symptoms which may indicate expansion of the stroke or the area of the edema resulting in the clinical change. The pattern is suggestive of cardioembolic etiology.        Mixed hyperlipidemia  -Stroke risk factor.  LDL pending.  -Atorvastatin 40 mg daily.    DM II (diabetes mellitus, type II), controlled  -Stroke risk factor.    -Goal glucose 140-180 for now.  -Hold home meds for now.  -Moderate correction SSI and QAC/HS accuchecks, titrate prn         09/28/2020 TTE showing 70% EF (Grade II diastolic dysfunction) with severe left atrial enlargement. MRI w/o contrast showing multiple areas of diffusion signal abnormality involving the right cerebral hemisphere        STROKE DOCUMENTATION   Acute Stroke Times   Symptom Onset Date: 09/26/20  Symptom Onset Time: 2215    NIH Scale:  1a. Level of Consciousness: 0-->Alert, keenly responsive  1b. LOC Questions: 0-->Answers both questions correctly  1c. LOC Commands: 0-->Performs both tasks correctly  2. Best Gaze: 0-->Normal  3. Visual: 0-->No visual loss  4. Facial Palsy: 0-->Normal symmetrical movements  5a. Motor Arm, Left: 0-->No drift, limb holds 90 (or 45) degrees for full 10 secs  5b. Motor Arm, Right: 0-->No drift, limb holds 90 (or 45) degrees for full 10 secs  6a. Motor Leg, Left: 0-->No drift, leg holds 30 degree position for full 5 secs  6b. Motor Leg, Right: 0-->No drift, leg holds 30 degree position for full 5 secs  7. Limb Ataxia: 0-->Absent  8. Sensory: 0-->Normal, no sensory loss  9. Best Language: 0-->No aphasia, normal  10. Dysarthria: 1-->Mild-to-moderate dysarthria, patient slurs at least some words and, at worst, can be understood with some difficulty  11. Extinction and Inattention (formerly Neglect): 0-->No abnormality  Total (NIH Stroke Scale): 1       Modified [place] Score: 1  [place] Coma Scale:15   ABCD2 Score:    [id number] Score:   HAS -BLED Score:   ICH Score:   Hunt & Newell Classification:      Hemorrhagic change  of an Ischemic Stroke: Does this patient have an ischemic stroke with hemorrhagic changes? No     Neurologic Chief Complaint: Right MCA stroke ( dysarthia, right gaze preference and a right facial droop)    Subjective:     Interval History: Patient is seen for follow-up neurological assessment and treatment recommendations:     Multiple areas of diffusion signal abnormality involving the right cerebral hemisphere    HPI, Past Medical, Family, and Social History remains the same as documented in the initial encounter.     Review of Systems  Scheduled Meds:   aspirin  81 mg Oral Daily    atorvastatin  40 mg Oral Daily    heparin (porcine)  5,000 Units Subcutaneous Q8H    psyllium husk (aspartame)  1 packet Oral Daily     Continuous Infusions:   sodium chloride 0.9%       PRN Meds:acetaminophen, bisacodyL, dextrose 50%, dextrose 50%, glucagon (human recombinant), glucose, glucose, insulin aspart U-100, labetaloL, ondansetron, ondansetron, sodium chloride 0.9%, sodium chloride 0.9%    Objective:     Vital Signs (Most Recent):  Temp: 99.1 °F (37.3 °C) (09/28/20 1200)  Pulse: 78 (09/28/20 1200)  Resp: 18 (09/28/20 1200)  BP: (!) 142/65 (09/28/20 1200)  SpO2: 99 % (09/28/20 1200)  BP Location: Left arm    Vital Signs Range (Last 24H):  Temp:  [97 °F (36.1 °C)-99.1 °F (37.3 °C)]   Pulse:  []   Resp:  [16-18]   BP: (134-152)/(57-70)   SpO2:  [95 %-99 %]   BP Location: Left arm    Physical Exam  Vitals signs and nursing note reviewed.   Constitutional:       General: She is not in acute distress.     Appearance: She is well-developed. She is obese. She is ill-appearing. She is not diaphoretic.   HENT:      Head: Normocephalic and atraumatic.      Right Ear: External ear normal.      Left Ear: External ear normal.      Nose: Nose normal.      Mouth/Throat:      Mouth: Mucous membranes are dry.   Eyes:      General: No scleral icterus.        Right eye: No discharge.         Left eye: No discharge.      Extraocular  Movements:      Right eye: Abnormal extraocular motion present.      Left eye: Abnormal extraocular motion present.      Conjunctiva/sclera: Conjunctivae normal.      Pupils: Pupils are equal, round, and reactive to light.   Neck:      Musculoskeletal: Normal range of motion and neck supple.   Cardiovascular:      Rate and Rhythm: Normal rate and regular rhythm.   Pulmonary:      Effort: Pulmonary effort is normal. No respiratory distress.      Breath sounds: Normal breath sounds.   Abdominal:      General: Bowel sounds are decreased. There is no distension.      Palpations: Abdomen is soft.      Tenderness: There is no abdominal tenderness.   Musculoskeletal:      Right lower leg: No edema.      Left lower leg: No edema.   Skin:     General: Skin is warm and dry.      Capillary Refill: Capillary refill takes less than 2 seconds.   Neurological:      Mental Status: She is alert and oriented to person, place, and time.      Motor: Weakness present.         Neurological Exam:   LOC: alert  Attention Span: Good   Language: No aphasia  Articulation: No dysarthria  Orientation: Person, Place, Time   Visual Fields: Full  EOM (CN III, IV, VI): Full/intact  Pupils (CN II, III): PERRL  Facial Sensation (CN V): Normal  Facial Movement (CN VII): Lower facial weakness on the Left  Gag Reflex: present  Reflexes: 2+ throughout  Motor: Arm left  Paresis: 3/5  Leg left  Paresis: 4/5  Arm right  Paresis: 4/5  Leg right Paresis: 4/5  Cebellar: Axia/Trunk Instability/Ataxia   Sensation: Intact to light touch, temperature and vibration  Tone: Normal tone throughout    Laboratory:  CMP:   Recent Labs   Lab 09/28/20  0353   CALCIUM 8.6*   ALBUMIN 3.4*   PROT 6.6      K 3.4*   CO2 24      BUN 10   CREATININE 0.7   ALKPHOS 109   ALT 14   AST 14   BILITOT 0.6     BMP:   Recent Labs   Lab 09/28/20  0353      K 3.4*      CO2 24   BUN 10   CREATININE 0.7   CALCIUM 8.6*     CBC:   Recent Labs   Lab 09/28/20  0353   WBC  7.41   RBC 5.10   HGB 13.1   HCT 43.4      MCV 85   MCH 25.7*   MCHC 30.2*     Lipid Panel:   Recent Labs   Lab 09/27/20  0418   CHOL 155   LDLCALC 96.2   HDL 45   TRIG 69     Coagulation:   Recent Labs   Lab 09/28/20  0353   INR 1.0   APTT 25.5     Platelet Aggregation Study: No results for input(s): PLTAGG, PLTAGINTERP, PLTAGREGLACO, ADPPLTAGGREG in the last 168 hours.  Hgb A1C:   Recent Labs   Lab 09/27/20 0418   HGBA1C 6.5*     TSH:   Recent Labs   Lab 09/27/20 0418   TSH 0.947       Diagnostic Results     Brain imaging:  CTH 9/26/2020 (obtained at OSH) Findings suggestive of acute infarct in the right insular region, junction of the right temporal, frontal and parietal lobes and further within the right parietal lobe.  No significant mass effect or midline shift.  No acute intracranial hemorrhage.     CTH 9/27/2020 Hypodensity noted in the right MCA territory     MRI Brain      Multiple areas of diffusion signal abnormality involving the right cerebral hemisphere, consistent with acute ischemia/infarct as discussed above.    Vessel Imaging:  CTA Head and Neck 9/27/2020 (obtained at OSH) extensive thrombus throughout the right cervical internal carotid artery with trace peripheral flow.  Left internal carotid artery is unremarkable.  Complete occlusion of the majority of the right ICA with mild reconstitution at the carotid terminus.  Complete occlusion of the inferior M2 segment of the right MCA.     Cardiac Evaluation:   TTE   70% EF (Grade II diastolic dysfunction) with severe left atrial enlargement.       Abhijit Jaquez MD  Comprehensive Stroke Center  Department of Vascular Neurology   [facility]

## 2020-09-28 NOTE — NURSING
The pt has a slightly high pulse ranging from the WDL to 104. The pt does not complain of a HA, dizziness, or discomfort at this time. Also, the pt has had two bouts of loose stools during the day shift as reported by the day nurse and the pt's sister at the bedside. The pt has had only one small/med loose stool at 2054. The pt is currently resting, no signs of distress, no complain of pain at this time. The pt's sister is still at the bedside. Will continue to monitor the pt's pulse rate and stool status.

## 2020-09-29 PROBLEM — I48.91 ATRIAL FIBRILLATION: Status: ACTIVE | Noted: 2020-09-27

## 2020-09-29 LAB
ALBUMIN SERPL BCP-MCNC: 3.4 G/DL (ref 3.5–5.2)
ALP SERPL-CCNC: 104 U/L (ref 55–135)
ALT SERPL W/O P-5'-P-CCNC: 13 U/L (ref 10–44)
ANION GAP SERPL CALC-SCNC: 12 MMOL/L (ref 8–16)
AST SERPL-CCNC: 14 U/L (ref 10–40)
BASOPHILS # BLD AUTO: 0.04 K/UL (ref 0–0.2)
BASOPHILS NFR BLD: 0.5 % (ref 0–1.9)
BILIRUB SERPL-MCNC: 0.5 MG/DL (ref 0.1–1)
BUN SERPL-MCNC: 12 MG/DL (ref 8–23)
CALCIUM SERPL-MCNC: 8.6 MG/DL (ref 8.7–10.5)
CHLORIDE SERPL-SCNC: 105 MMOL/L (ref 95–110)
CO2 SERPL-SCNC: 23 MMOL/L (ref 23–29)
CREAT SERPL-MCNC: 0.6 MG/DL (ref 0.5–1.4)
DIFFERENTIAL METHOD: ABNORMAL
EOSINOPHIL # BLD AUTO: 0.2 K/UL (ref 0–0.5)
EOSINOPHIL NFR BLD: 2 % (ref 0–8)
ERYTHROCYTE [DISTWIDTH] IN BLOOD BY AUTOMATED COUNT: 15.5 % (ref 11.5–14.5)
EST. GFR  (AFRICAN AMERICAN): >60 ML/MIN/1.73 M^2
EST. GFR  (NON AFRICAN AMERICAN): >60 ML/MIN/1.73 M^2
GLUCOSE SERPL-MCNC: 115 MG/DL (ref 70–110)
HCT VFR BLD AUTO: 41.2 % (ref 37–48.5)
HGB BLD-MCNC: 12.6 G/DL (ref 12–16)
IMM GRANULOCYTES # BLD AUTO: 0.03 K/UL (ref 0–0.04)
IMM GRANULOCYTES NFR BLD AUTO: 0.4 % (ref 0–0.5)
LYMPHOCYTES # BLD AUTO: 2.2 K/UL (ref 1–4.8)
LYMPHOCYTES NFR BLD: 28.8 % (ref 18–48)
MCH RBC QN AUTO: 25.6 PG (ref 27–31)
MCHC RBC AUTO-ENTMCNC: 30.6 G/DL (ref 32–36)
MCV RBC AUTO: 84 FL (ref 82–98)
MONOCYTES # BLD AUTO: 0.7 K/UL (ref 0.3–1)
MONOCYTES NFR BLD: 8.9 % (ref 4–15)
NEUTROPHILS # BLD AUTO: 4.6 K/UL (ref 1.8–7.7)
NEUTROPHILS NFR BLD: 59.4 % (ref 38–73)
NRBC BLD-RTO: 0 /100 WBC
PLATELET # BLD AUTO: 250 K/UL (ref 150–350)
PMV BLD AUTO: 11.5 FL (ref 9.2–12.9)
POCT GLUCOSE: 114 MG/DL (ref 70–110)
POCT GLUCOSE: 115 MG/DL (ref 70–110)
POCT GLUCOSE: 122 MG/DL (ref 70–110)
POCT GLUCOSE: 134 MG/DL (ref 70–110)
POTASSIUM SERPL-SCNC: 3.6 MMOL/L (ref 3.5–5.1)
PROT SERPL-MCNC: 6.5 G/DL (ref 6–8.4)
RBC # BLD AUTO: 4.92 M/UL (ref 4–5.4)
SODIUM SERPL-SCNC: 140 MMOL/L (ref 136–145)
WBC # BLD AUTO: 7.67 K/UL (ref 3.9–12.7)

## 2020-09-29 PROCEDURE — 63600175 PHARM REV CODE 636 W HCPCS: Performed by: NURSE PRACTITIONER

## 2020-09-29 PROCEDURE — 36415 COLL VENOUS BLD VENIPUNCTURE: CPT

## 2020-09-29 PROCEDURE — 25000003 PHARM REV CODE 250: Performed by: STUDENT IN AN ORGANIZED HEALTH CARE EDUCATION/TRAINING PROGRAM

## 2020-09-29 PROCEDURE — 99252 PR INITIAL INPATIENT CONSULT,LEVL II: ICD-10-PCS | Mod: ,,, | Performed by: NURSE PRACTITIONER

## 2020-09-29 PROCEDURE — 25000003 PHARM REV CODE 250: Performed by: NURSE PRACTITIONER

## 2020-09-29 PROCEDURE — 11000001 HC ACUTE MED/SURG PRIVATE ROOM

## 2020-09-29 PROCEDURE — 99252 IP/OBS CONSLTJ NEW/EST SF 35: CPT | Mod: ,,, | Performed by: NURSE PRACTITIONER

## 2020-09-29 PROCEDURE — 97116 GAIT TRAINING THERAPY: CPT

## 2020-09-29 PROCEDURE — 92507 TX SP LANG VOICE COMM INDIV: CPT

## 2020-09-29 PROCEDURE — 97530 THERAPEUTIC ACTIVITIES: CPT

## 2020-09-29 PROCEDURE — 80053 COMPREHEN METABOLIC PANEL: CPT

## 2020-09-29 PROCEDURE — 99233 PR SUBSEQUENT HOSPITAL CARE,LEVL III: ICD-10-PCS | Mod: ,,, | Performed by: PSYCHIATRY & NEUROLOGY

## 2020-09-29 PROCEDURE — 63600175 PHARM REV CODE 636 W HCPCS: Performed by: STUDENT IN AN ORGANIZED HEALTH CARE EDUCATION/TRAINING PROGRAM

## 2020-09-29 PROCEDURE — 85025 COMPLETE CBC W/AUTO DIFF WBC: CPT

## 2020-09-29 PROCEDURE — 99233 SBSQ HOSP IP/OBS HIGH 50: CPT | Mod: ,,, | Performed by: PSYCHIATRY & NEUROLOGY

## 2020-09-29 PROCEDURE — 97535 SELF CARE MNGMENT TRAINING: CPT

## 2020-09-29 RX ORDER — PANTOPRAZOLE SODIUM 40 MG/1
40 TABLET, DELAYED RELEASE ORAL DAILY
Status: DISCONTINUED | OUTPATIENT
Start: 2020-09-29 | End: 2020-10-01 | Stop reason: HOSPADM

## 2020-09-29 RX ORDER — AZELASTINE HCL 205.5 UG/1
2 SPRAY NASAL 2 TIMES DAILY
Status: ON HOLD | COMMUNITY
End: 2020-09-30 | Stop reason: CLARIF

## 2020-09-29 RX ORDER — CETIRIZINE 2.4 MG/ML
1 SOLUTION/ DROPS OPHTHALMIC 2 TIMES DAILY
COMMUNITY

## 2020-09-29 RX ORDER — METOPROLOL TARTRATE 25 MG/1
12.5 TABLET ORAL 2 TIMES DAILY
COMMUNITY

## 2020-09-29 RX ORDER — LEVOTHYROXINE SODIUM 88 UG/1
88 TABLET ORAL
Status: DISCONTINUED | OUTPATIENT
Start: 2020-09-30 | End: 2020-10-01 | Stop reason: HOSPADM

## 2020-09-29 RX ORDER — PANTOPRAZOLE SODIUM 40 MG/1
40 TABLET, DELAYED RELEASE ORAL DAILY
COMMUNITY

## 2020-09-29 RX ORDER — LEVOTHYROXINE SODIUM 88 UG/1
88 TABLET ORAL
COMMUNITY

## 2020-09-29 RX ORDER — DAPAGLIFLOZIN 10 MG/1
10 TABLET, FILM COATED ORAL DAILY
COMMUNITY

## 2020-09-29 RX ORDER — ACETAMINOPHEN 325 MG/1
325 TABLET ORAL EVERY 6 HOURS PRN
COMMUNITY

## 2020-09-29 RX ADMIN — ASPIRIN 81 MG: 81 TABLET, COATED ORAL at 09:09

## 2020-09-29 RX ADMIN — ONDANSETRON 4 MG: 2 INJECTION INTRAMUSCULAR; INTRAVENOUS at 02:09

## 2020-09-29 RX ADMIN — HEPARIN SODIUM 5000 UNITS: 5000 INJECTION INTRAVENOUS; SUBCUTANEOUS at 06:09

## 2020-09-29 RX ADMIN — ACETAMINOPHEN 650 MG: 325 TABLET ORAL at 12:09

## 2020-09-29 RX ADMIN — METOPROLOL TARTRATE 12.5 MG: 25 TABLET, FILM COATED ORAL at 09:09

## 2020-09-29 RX ADMIN — ONDANSETRON 8 MG: 8 TABLET, ORALLY DISINTEGRATING ORAL at 07:09

## 2020-09-29 RX ADMIN — HEPARIN SODIUM 5000 UNITS: 5000 INJECTION INTRAVENOUS; SUBCUTANEOUS at 02:09

## 2020-09-29 RX ADMIN — HEPARIN SODIUM 5000 UNITS: 5000 INJECTION INTRAVENOUS; SUBCUTANEOUS at 09:09

## 2020-09-29 RX ADMIN — ATORVASTATIN CALCIUM 40 MG: 20 TABLET, FILM COATED ORAL at 09:09

## 2020-09-29 NOTE — PLAN OF CARE
Problem: SLP Goal  Goal: SLP Goal  Description: Speech Language Pathology Goals  Goals expected to be met by 10/5:  1. Pt will tolerate a mechanical soft diet and thin liquids without s/s of aspiration.   2. Pt will participate in ongoing swallowing assessment to determine if/when appropriate for further diet advancement.   3. Pt will perform OME's x 10 to increase oral motor strength and ROM.   4. Pt will participate in speech/language/cognitive evaluation. Goal met/initiated 9/28  ADDITIONAL GOALS:  4. Pt will recall 3/3 motor speech strategies.  5. Pt will utilize motor speech strategies during connected speech to improve speech intelligibility to 90% given min cues.   6. Pt will participate in evaluation of reading, writing, visual spatial, and functional math abilities. Writing and visual spatial abilities assessed 9/29. Left neglect evident. Waiting for reading glasses to complete reading addessment.           Outcome: Ongoing, Progressing  Pt making progress towards meeting goals. Cont POC .   ALONZO Corrigan, CCC-SLP  Speech Language Pathologist  [phone]  9/29/2020

## 2020-09-29 NOTE — SUBJECTIVE & OBJECTIVE
Neurologic Chief Complaint: Right MCA stroke ( dysarthia, right gaze preference and a right facial droop)    Subjective:     Interval History: Patient is seen for follow-up neurological assessment and treatment recommendations:     Will obtain TCD bubble study to differentiate if emboli is coming from carotids or if it due to atrial fibrillation. Lopressor 12.5 mg BID started yesterday (home medications). Patient also would like to go to rehab near [place].     HPI, Past Medical, Family, and Social History remains the same as documented in the initial encounter.     Review of Systems    Scheduled Meds:   aspirin  81 mg Oral Daily    atorvastatin  40 mg Oral Daily    heparin (porcine)  5,000 Units Subcutaneous Q8H    metoprolol tartrate  12.5 mg Oral BID    psyllium husk (aspartame)  1 packet Oral Daily     Continuous Infusions:   sodium chloride 0.9%       PRN Meds:acetaminophen, bisacodyL, dextrose 50%, dextrose 50%, glucagon (human recombinant), glucose, glucose, insulin aspart U-100, labetaloL, ondansetron, ondansetron, sodium chloride 0.9%, sodium chloride 0.9%    Objective:     Vital Signs (Most Recent):  Temp: 98.2 °F (36.8 °C) (09/29/20 0930)  Pulse: 107 (09/29/20 0930)  Resp: 18 (09/29/20 0930)  BP: (!) 149/65 (09/29/20 0930)  SpO2: 97 % (09/29/20 0930)  BP Location: Right arm    Vital Signs Range (Last 24H):  Temp:  [97.8 °F (36.6 °C)-98.6 °F (37 °C)]   Pulse:  []   Resp:  [18]   BP: (116-156)/(53-67)   SpO2:  [83 %-97 %]   BP Location: Right arm    Physical Exam  Vitals signs and nursing note reviewed.   Constitutional:       General: She is not in acute distress.     Appearance: She is well-developed. She is obese. She is ill-appearing. She is not diaphoretic.   HENT:      Head: Normocephalic and atraumatic.      Right Ear: External ear normal.      Left Ear: External ear normal.      Nose: Nose normal.      Mouth/Throat:      Mouth: Mucous membranes are dry.   Eyes:      General: No  scleral icterus.        Right eye: No discharge.         Left eye: No discharge.      Extraocular Movements:      Right eye: Abnormal extraocular motion present.      Left eye: Abnormal extraocular motion present.      Conjunctiva/sclera: Conjunctivae normal.      Pupils: Pupils are equal, round, and reactive to light.   Neck:      Musculoskeletal: Normal range of motion and neck supple.   Cardiovascular:      Rate and Rhythm: Normal rate and regular rhythm.   Pulmonary:      Effort: Pulmonary effort is normal. No respiratory distress.      Breath sounds: Normal breath sounds.   Abdominal:      General: Bowel sounds are decreased. There is no distension.      Palpations: Abdomen is soft.      Tenderness: There is no abdominal tenderness.   Musculoskeletal:      Right lower leg: No edema.      Left lower leg: No edema.   Skin:     General: Skin is warm and dry.      Capillary Refill: Capillary refill takes less than 2 seconds.   Neurological:      Mental Status: She is alert and oriented to person, place, and time.      Motor: Weakness present.         Neurological Exam:   LOC: alert  Attention Span: Good   Language: No aphasia  Articulation: No dysarthria  Orientation: Person, Place, Time   Visual Fields: Full  EOM (CN III, IV, VI): Full/intact  Pupils (CN II, III): PERRL  Facial Sensation (CN V): Normal  Facial Movement (CN VII): Lower facial weakness on the Left  Gag Reflex: present  Reflexes: 2+ throughout  Motor: Arm left  Paresis: 3/5  Leg left  Paresis: 4/5  Arm right  Paresis: 4/5  Leg right Paresis: 4/5  Cebellar: Axia/Trunk Instability/Ataxia   Sensation: Intact to light touch, temperature and vibration  Tone: Normal tone throughout    Laboratory:  CMP:   Recent Labs   Lab 09/29/20  0423   CALCIUM 8.6*   ALBUMIN 3.4*   PROT 6.5      K 3.6   CO2 23      BUN 12   CREATININE 0.6   ALKPHOS 104   ALT 13   AST 14   BILITOT 0.5     BMP:   Recent Labs   Lab 09/29/20  0423      K 3.6      CO2  23   BUN 12   CREATININE 0.6   CALCIUM 8.6*     CBC:   Recent Labs   Lab 09/29/20  0423   WBC 7.67   RBC 4.92   HGB 12.6   HCT 41.2      MCV 84   MCH 25.6*   MCHC 30.6*     Lipid Panel:   Recent Labs   Lab 09/27/20  0418   CHOL 155   LDLCALC 96.2   HDL 45   TRIG 69     Coagulation:   Recent Labs   Lab 09/28/20  0353   INR 1.0   APTT 25.5     Platelet Aggregation Study: No results for input(s): PLTAGG, PLTAGINTERP, PLTAGREGLACO, ADPPLTAGGREG in the last 168 hours.  Hgb A1C:   Recent Labs   Lab 09/27/20 0418   HGBA1C 6.5*     TSH:   Recent Labs   Lab 09/27/20 0418   TSH 0.947       Diagnostic Results     Brain imaging:  CTH 9/26/2020 (obtained at OSH) Findings suggestive of acute infarct in the right insular region, junction of the right temporal, frontal and parietal lobes and further within the right parietal lobe.  No significant mass effect or midline shift.  No acute intracranial hemorrhage.     CTH 9/27/2020 Hypodensity noted in the right MCA territory     MRI Brain      Multiple areas of diffusion signal abnormality involving the right cerebral hemisphere, consistent with acute ischemia/infarct as discussed above.    Vessel Imaging:  CTA Head and Neck 9/27/2020 (obtained at OSH) extensive thrombus throughout the right cervical internal carotid artery with trace peripheral flow.  Left internal carotid artery is unremarkable.  Complete occlusion of the majority of the right ICA with mild reconstitution at the carotid terminus.  Complete occlusion of the inferior M2 segment of the right MCA.     Cardiac Evaluation:   TTE   70% EF (Grade II diastolic dysfunction) with severe left atrial enlargement.

## 2020-09-29 NOTE — PT/OT/SLP PROGRESS
Physical Therapy Treatment    Patient Name:  Su Dent   MRN:  [id number]  Admit Date: 9/27/2020  Admitting Diagnosis:  Embolic stroke involving right middle cerebral artery   Length of Stay: 2 days  Recent Surgery: * No surgery found *      Recommendations:     Discharge Recommendations:  rehabilitation facility   Discharge Equipment Recommendations: none   Barriers to discharge: Decreased caregiver support    Plan:     During this hospitalization, patient to be seen 4 x/week to address the listed problems via gait training, therapeutic activities, therapeutic exercises, neuromuscular re-education, wheelchair management/training  · Plan of Care Expires:  10/26/20   Plan of Care Reviewed with: patient, son    Assessment:     Su Dent is a 64 y.o. female admitted with a medical diagnosis of Embolic stroke involving right middle cerebral artery.   Patient is making progress towards goals, participating well in this session. Pt continues to require significant assist with symmetrical transfers with adequate weight shift onto LLE. Pt with continued slowed motor planning and absent LUE and LLE sensation. Pt c/o feeling weak, provided increased education on importance of staying UIC and participation in therapy, pt and son verbalized understanding. Education was provided to patient & family regarding importance of continued participation in therapy, patient's progress and discharge disposition. Pt continues to benefit from a collaborative PT/OT/SLP program to improve quality of life and focus on recovery of impairments.     Problem List: weakness, impaired sensation, impaired self care skills, impaired functional mobilty, gait instability, impaired balance, decreased upper extremity function, decreased lower extremity function, decreased safety awareness, impaired cardiopulmonary response to activity, visual deficits.  Rehab Prognosis: Good     GOALS:   Multidisciplinary Problems     Physical Therapy Goals   "      Problem: Physical Therapy Goal    Goal Priority Disciplines Outcome Goal Variances Interventions   Physical Therapy Goal     PT, PT/OT Ongoing, Progressing     Description: Goals to be met by: 10/4/2020    Patient will increase functional independence with mobility by performin. Supine <> sit with Supervision.  2. Sit <> stand transfer with Contact Guard Assistance using No Assistive Device and Rolling Walker.  3. Bed <> chair transfer via Stand Pivot with Contact Guard Assistance using No Assistive Device and Rolling Walker.  4. Gait  x 50 feet with Contact Guard Assistance using No Assistive Device and Rolling Walker to prepare for community ambulation and endurance activities.  5. Dynamic standing for 8 minutes with Contact Guard Assistance using No Assistive Device to prepare for functional tasks in standing.  6. Able to tolerate exercise for 15-20 reps with independence.                         Subjective   Communicated with RN prior to session.  Patient found supine upon PT entry to room, agreeable to evaluation. Su Dent's son present during session.    Patient/Family Comments/goals: "I just feel weak." "I have a bad habit of vagaling." "I feel like my blood pressure drops."  Pain/Comfort:  · Pain Rating 1: 0/10  · Pain Rating Post-Intervention 1: 0/10    Objective:   Patient found with: telemetry   General Precautions: Standard, Cardiac aspiration, fall   Orthopedic Precautions:N/A   Braces: N/A   Oxygen Device: Room Air  Vitals: /61 (BP Location: Right arm, Patient Position: Lying)   Pulse 68   Temp 97.7 °F (36.5 °C) (Oral)   Resp 18   Ht 5' 5" (1.651 m)   Wt 98.4 kg (217 lb)   SpO2 99%   Breastfeeding No   BMI 36.11 kg/m²     Outcome Measures:  AM-PAC 6 CLICK MOBILITY  Turning over in bed (including adjusting bedclothes, sheets and blankets)?: 3  Sitting down on and standing up from a chair with arms (e.g., wheelchair, bedside commode, etc.): 2  Moving from lying on back " to sitting on the side of the bed?: 2  Moving to and from a bed to a chair (including a wheelchair)?: 2  Need to walk in hospital room?: 2  Climbing 3-5 steps with a railing?: 1  Basic Mobility Total Score: 12     Cognition:   · Alert and Cooperative  · AxOx4  · Command following: Follows two-step commands  · Fluency: dysarthria    Functional Mobility:  Additional staff present: N/A  Bed Mobility:    Supine to Sit: minimum assistance; HOB flat and from right side of bed   Scooting anteriorly to EOB to have both feet planted on floor: moderate assistance   - To scoot L foot onto floor    Transfers:    Sit <> Stand Transfer: minimum assistance with hand-held assist    Stand <> Sit Transfer: minimum assistance with hand-held assist   o [id number] from EOB, [id number] from bedside chair and [id number] from bedside commode   Bed <> Chair Transfer: Step Transfer technique with minimum assistance with hand-held assist  o Chair on patient's right   Chair <> Bed Transfer: Step Transfer technique with minimum assistance with hand-held assist  o Bed on patient's left      Gait:  · Patient ambulated: 5 steps to/from bedside commode, 5 steps to bedside chair, 4 steps forward/bkwd/   · Patient required: moderate assist  · Patient used: hand-held assist  · Gait Pattern observed: 2-point gait  · Gait Deviation(s): unsteady gait, decreased step length, narrow base of support, decreased weight shift, decreased joanna and increased time in stance phase on unaffected leg  · Impairments due to: impaired balance and decreased strength  · Comments: pt with difficulty advancing LLE, vc's and assist with weight shifting  · BP: 136/65 (93) post ambulation and standing trials.    Therapeutic Activities & Exercises:   Education:  Patient provided with daily orientation and goals of this PT session. Patient and family agreed to participate in session. Patient and family aware of patient's deficits and therapy progression. They were educated to  transfer to bedside chair/bedside commode/bathroom with RN/PCT present. Patient and Family member educated on Fall risk, home safety, Home exercise program and plan of care by explanation. Patient was receptive to education and verbalizes understanding. Encouraged patient to perform daily exercises & mobility to increase endurance and decrease effects of bedrest.Time provided for therapeutic counseling and discussion of health disposition. All questions answered to patient's and family's satisfaction, within scope of PT practice; voiced no other concerns. White board updated in patient's room, RN notified of session.    Patient left up in chair, with head in midline, neutral pelvis & heels floated for skin protection with all lines intact, call button in reach and RN notified  Time Tracking:     PT Received On: 09/29/20  PT Start Time: 1016     PT Stop Time: 1046  PT Total Time (min): 30 min     Billable Minutes:   · Gait Training 15 and Therapeutic Activity 15    Treatment Type: Treatment  PT/PTA: PT       Radha Romero PT, DPT  09/29/2020  Pager: [phone]

## 2020-09-29 NOTE — PLAN OF CARE
Goals still appropriate at this time.    Problem: Occupational Therapy Goal  Goal: Occupational Therapy Goal  Description: Goals to be met by: 10/4     Patient will increase functional independence with ADLs by performing:    UE Dressing while seated EOB with Minimal Assistance.  LE Dressing (pants, brief) with Minimal Assistance.  Grooming while standing at sink with Minimal Assistance.  Toileting from toilet with Minimal Assistance for hygiene and clothing management.   Toilet transfer to toilet with Minimal Assistance.  Functional mobility at household distance with min(A).    Outcome: Ongoing, Progressing     Zeinab Stark  9/29/2020

## 2020-09-29 NOTE — PT/OT/SLP PROGRESS
"Speech Language Pathology Treatment    Patient Name:  Su Dent   MRN:  [id number]  Admitting Diagnosis: Embolic stroke involving right middle cerebral artery    Recommendations:                 General Recommendations:  Dysphagia therapy, Speech/language therapy and Cognitive-linguistic therapy  Diet recommendations:  Mechanical soft, Liquid Diet Level: Thin   Aspiration Precautions: 1 bite/sip at a time, Alternating bites/sips, Avoid talking while eating, Check for pocketing/oral residue, Frequent oral care, HOB to 90 degrees, Monitor for s/s of aspiration, Small bites/sips and Strict aspiration precautions   General Precautions: Standard, aspiration, fall, vision impaired(left neglect)  Communication strategies:  encourage use of motor speech strategies    Subjective     "I had myself some mac and cheese."    Pain/Comfort:  · Pain Rating 1: 0/10    Objective:     Has the patient been evaluated by SLP for swallowing?   Yes  Keep patient NPO? No   Current Respiratory Status: room air      Pt seen at bedside with son present.  Pt stated she at approx 8 bites of mac and cheese lunch. She reported chewing on right side of mouth to reduce potential for pocketing.  Pt performed OME's x 10 with good effort.  Left facial droop, decreased labial retraction and seal on left, decreased buccal tone on left, and decreased left lingual ROM remains evident.  SLP introduced motor speech strategies.  Pt generated sentences and connected speech after given multi-syllabic words with 100% intelligibility without need for cues.  Pt was able to read a title with large print, but did not attempt a smaller print paragraph since reading glasses were not present.  Pt's son plans to bring reading glasses.  Pt uses right/dominant hand to write full name and draw the face of a clock.  Left neglect was evident.  Education provided to pt/son regarding OME's, dysarthria, motor speech strategies, left neglect, and SLP treatment plan and POC. "  Pt/son demonstrated understanding of education provided, but will benefit from continued reinforcement.       Assessment:     Su Dent is a 64 y.o. female with an SLP diagnosis of Dysphagia, Dysarthria, Cognitive-Linguistic Impairment and Visio-Spatial Impairment.      Goals:   Multidisciplinary Problems     SLP Goals        Problem: SLP Goal    Goal Priority Disciplines Outcome   SLP Goal     SLP Ongoing, Progressing   Description: Speech Language Pathology Goals  Goals expected to be met by 10/5:  1. Pt will tolerate a mechanical soft diet and thin liquids without s/s of aspiration.   2. Pt will participate in ongoing swallowing assessment to determine if/when appropriate for further diet advancement.   3. Pt will perform OME's x 10 to increase oral motor strength and ROM.   4. Pt will participate in speech/language/cognitive evaluation. Goal met/initiated 9/28  ADDITIONAL GOALS:  4. Pt will recall 3/3 motor speech strategies.  5. Pt will utilize motor speech strategies during connected speech to improve speech intelligibility to 90% given min cues.   6. Pt will participate in evaluation of reading, writing, visual spatial, and functional math abilities. Writing and visual spatial abilities assessed 9/29. Left neglect evident. Waiting for reading glasses to complete reading addessment.                            Plan:     · Patient to be seen:  5 x/week   · Plan of Care expires:  10/27/20  · Plan of Care reviewed with:  patient, son   · SLP Follow-Up:  Yes       Discharge recommendations:  rehabilitation facility     Time Tracking:     SLP Treatment Date:   09/29/20  Speech Start Time:  1248  Speech Stop Time:  1313     Speech Total Time (min):  25 min    Billable Minutes: Speech Therapy Individual 15 and Seld Care/Home Management Training 10    ALONZO Corrigan, SHALINI-SLP  09/29/2020     ALONZO Corrigan, CCC-SLP  Speech Language Pathologist  [phone]  9/29/2020

## 2020-09-29 NOTE — PHARMACY MED REC
"Admission Medication Reconciliation - Pharmacy Consult Note    The home medication history was obtained from patient.  Based on information gathered and subsequent review by the clinical pharmacist, the items below may need attention.     You may go to "Admission" then "Reconcile Home Medications" tabs to review and/or act upon these items.     Potentially problematic discrepancies with current MAR  o Patient IS taking the following which was not ordered upon admit  o Januvia 100mg daily  o Farxiga 10mg daily  o Lantus 35 U at bedtime  o Zerviate 0.24% eye drops  o Azelastine 0.15% nasal spray  o Consider holding oral antidiabetic agents while admitted to lower hypoglycemic risk and utilizing insulin detemir as our formulary alternative to Lantus.    Please address this information as you see fit.  Feel free to contact us if you have any questions or require assistance.    [place], PharmD, BCPS  EXT [phone]                  .    .            "

## 2020-09-29 NOTE — ASSESSMENT & PLAN NOTE
Su Dent is a 64 y.o. female with significant medical history of HLD, DM II presented to hospital as a transfer from [facility] for evaluation of R MCA stroke.  tPA not administered due to the patient presenting outside of the treatment window.  Imaging at the OSH revealed findings concerning for hyperdense R MCA sign.  On arrival to this facility the patient had dysarthria and right gaze preference.  She was also noted to have a right facial droop and mild right sided drift.  No endovascular intervention at this time.    Etiology: Cardioembolic versus atheroembolic     TTE showing 70% EF (Grade II diastolic dysfunction) with severe left atrial enlargement. TCD bubble study showing emboli from the right and left. Due to this she will be on asa and apixiban     Antithrombotics for secondary stroke prevention: Antiplatelets: Aspirin: 81 mg daily and apixiban 5 mg BID   Statins for secondary stroke prevention and hyperlipidemia, if present:   Statins: Atorvastatin- 80 mg daily    Aggressive risk factor modification: DM, HLD, thyroid disease     Rehab efforts: The patient has been evaluated by a stroke team provider and the therapy needs have been fully considered based off the presenting complaints and exam findings. The following therapy evaluations are needed: PT evaluate and treat, OT evaluate and treat, SLP evaluate and treat    Diagnostics ordered/pending: None      VTE prophylaxis: Already anticoagulated     BP parameters: Infarct: No intervention, SBP <220

## 2020-09-29 NOTE — SUBJECTIVE & OBJECTIVE
Past Medical History:   Diagnosis Date    Diabetes mellitus     GERD (gastroesophageal reflux disease)     Liver disease     Skin cancer, basal cell     Thyroid disease      Past Surgical History:   Procedure Laterality Date    CHOLECYSTECTOMY  05/06/2016    HYSTERECTOMY      JOINT REPLACEMENT Right 11/13/2019    TONSILLECTOMY       Review of patient's allergies indicates:   Allergen Reactions    Adhesive     Codeine        Scheduled Medications:    aspirin  81 mg Oral Daily    atorvastatin  40 mg Oral Daily    heparin (porcine)  5,000 Units Subcutaneous Q8H    metoprolol tartrate  12.5 mg Oral BID    psyllium husk (aspartame)  1 packet Oral Daily       PRN Medications: acetaminophen, bisacodyL, dextrose 50%, dextrose 50%, glucagon (human recombinant), glucose, glucose, insulin aspart U-100, labetaloL, ondansetron, ondansetron, sodium chloride 0.9%, sodium chloride 0.9%    Family History     Family history is unknown by patient.        Tobacco Use    Smoking status: Never Smoker    Smokeless tobacco: Never Used   Substance and Sexual Activity    Alcohol use: Yes     Comment: rarely    Drug use: Never    Sexual activity: Not on file     Review of Systems   Constitutional: Positive for activity change. Negative for fatigue and fever.   HENT: Negative for trouble swallowing and voice change.    Eyes: Negative for photophobia and visual disturbance.   Respiratory: Negative for cough and shortness of breath.    Cardiovascular: Negative for chest pain and palpitations.   Gastrointestinal: Negative for nausea and vomiting.   Genitourinary: Negative for difficulty urinating and flank pain.   Musculoskeletal: Positive for gait problem. Negative for arthralgias.   Skin: Negative for color change and rash.   Neurological: Positive for facial asymmetry, speech difficulty and weakness.   Psychiatric/Behavioral: Positive for confusion. Negative for agitation.     Objective:     Vital Signs (Most  Recent):  Temp: 98.2 °F (36.8 °C) (09/29/20 0930)  Pulse: 107 (09/29/20 0930)  Resp: 18 (09/29/20 0930)  BP: (!) 149/65 (09/29/20 0930)  SpO2: 97 % (09/29/20 0930)    Vital Signs (24h Range):  Temp:  [97.8 °F (36.6 °C)-99.1 °F (37.3 °C)] 98.2 °F (36.8 °C)  Pulse:  [] 107  Resp:  [18] 18  SpO2:  [83 %-99 %] 97 %  BP: (116-156)/(53-67) 149/65     Body mass index is 36.11 kg/m².    Physical Exam  Vitals signs reviewed.   Constitutional:       General: She is not in acute distress.     Appearance: She is well-developed. She is obese.      Comments: Son at bedside   HENT:      Head: Normocephalic and atraumatic.   Eyes:      General:         Right eye: No discharge.         Left eye: No discharge.   Neck:      Musculoskeletal: Neck supple.   Cardiovascular:      Rate and Rhythm: Normal rate.   Pulmonary:      Effort: Pulmonary effort is normal. No respiratory distress.   Abdominal:      Palpations: Abdomen is soft.      Tenderness: There is no abdominal tenderness.   Musculoskeletal:         General: No deformity.   Skin:     General: Skin is warm and dry.   Neurological:      Mental Status: She is alert and oriented to person, place, and time.      Cranial Nerves: Dysarthria and facial asymmetry present.      Motor: Weakness present.      Comments: Follows commands    Psychiatric:         Behavior: Behavior normal.         Cognition and Memory: Cognition is impaired.       NEUROLOGICAL EXAMINATION:     MENTAL STATUS   Oriented to person, place, and time.       Diagnostic Results: Labs: Reviewed  CT: Reviewed  MRI: Reviewed

## 2020-09-29 NOTE — PT/OT/SLP PROGRESS
"Occupational Therapy   Treatment    Name: Su Dent  MRN: [id number]  Admitting Diagnosis:  Embolic stroke involving right middle cerebral artery       Recommendations:     Discharge Recommendations: rehabilitation facility  Discharge Equipment Recommendations:  none  Barriers to discharge:  Other (Comment)(lives alone, assistance required)    Assessment:     Su Dent is a 64 y.o. female with a medical diagnosis of Embolic stroke involving right middle cerebral artery.  She presents with L side weakness, L inattention, and R gaze preference. She was agreeable and willing to participate in OT session on this date. She c/o nausea while on BSC - addressed by placing cool wash cloth on forehead and fan ordered for room. She cont to be limited in safe engagement of and PLOF of ADLs and IADLs by fatigue, L inattention, weakness, and nausea. Performance deficits affecting function are weakness, visual deficits, impaired endurance, impaired cognition, impaired sensation, decreased coordination, impaired self care skills, decreased upper extremity function, impaired functional mobilty, decreased lower extremity function, gait instability, decreased safety awareness, impaired balance, impaired skin, impaired fine motor. She would best benefit from skilled OT at [facility] [place] in order to return home safely with PLOF.    Rehab Prognosis:  Fair; patient would benefit from acute skilled OT services to address these deficits and reach maximum level of function.       Plan:     Patient to be seen 4 x/week to address the above listed problems via self-care/home management, therapeutic activities, therapeutic exercises, neuromuscular re-education, cognitive retraining  · Plan of Care Expires: 10/26/20  · Plan of Care Reviewed with: patient, son    Subjective   "You're Zeinab Juan, and you're Marleny" - when OT and OTS arrived in room  "I have a vagal response" - when discussing episodes of nausea around the time of " voiding    Pain/Comfort:  · Pain Rating 1: 0/10     C/o nausea while seated on BSC    Objective:     Communicated with: RN prior to session.  Patient found up in chair from PT session with son in room with telemetry, peripheral IV upon OT entry to room.    General Precautions: Standard, vision impaired, fall, aspiration(L inattention)   Orthopedic Precautions:N/A   Braces: N/A     Occupational Performance:     Bed Mobility:    · Patient completed Sit to Supine with moderate assistance due to impulsive nature of t/f and cues for attention to L UE     Functional Mobility/Transfers:  · Patient completed Sit > Stand Transfer with minimum assistance with  hand-held assist from bedside chair, WB onto L UE  ·  stand > sit t/f to return to bed c mod(A) 2* fatigue and impulsivity   · Patient completed Toilet Transfer Step Transfer technique with moderate assistance with  hand-held assist and bedside commode  · Functional Mobility: chair-> BSC step t/f c HH assist c mod(A)  · BSC -> bed step t/f c HH assist c mod(A) x2 persons 2* fatigue and impulsivity     Activities of Daily Living:  · Grooming: minimum assistance for oral hygiene and SBA for verbal cueing for attention to L side while brushing hair  · CGA for face washing task in standing, moderate amount of cueing required before proper initiation of task 2* impulsive behavior for completion of previous grooming task in sitting  · Toileting: total assistance with toilet hygiene and max(A) for sit>stand t/f      [facility] 6 Click ADL: 13    Treatment & Education:  -pt and son re-edu on role of OT in this setting  -pt re edu on importance of WB and attention to L UE for safety  -pt engaged in oral hygiene while seated edge of chair - using L UE as a stabilizer while opening toothpaste and pulling out toothbrush with R UE   -pt WB through L UE on elevated lap desk in standing x2.5 minutes while performing self-care with wash cloth using R UE, and using R UE to transfer juice  containers across lap desk 4 reps x2  -pt completed household distance t/f mod(A) on this date 2* fatigue and nausea      Patient left left sidelying with call button in reachEducation:      GOALS:   Multidisciplinary Problems     Occupational Therapy Goals        Problem: Occupational Therapy Goal    Goal Priority Disciplines Outcome Interventions   Occupational Therapy Goal     OT, PT/OT Ongoing, Progressing    Description: Goals to be met by: 10/4     Patient will increase functional independence with ADLs by performing:    UE Dressing while seated EOB with Minimal Assistance.  LE Dressing (pants, brief) with Minimal Assistance.  Grooming while standing at sink with Minimal Assistance.  Toileting from toilet with Minimal Assistance for hygiene and clothing management.   Toilet transfer to toilet with Minimal Assistance.  Functional mobility at household distance with min(A).                     Time Tracking:     OT Date of Treatment: 09/29/20  OT Start Time: 1133  OT Stop Time: 1202  OT Total Time (min): 29 min    Billable Minutes:Self Care/Home Management 16  Therapeutic Activity 10    YUDITH Vanegas  9/29/2020

## 2020-09-29 NOTE — ASSESSMENT & PLAN NOTE
Su Dent is a 64 y.o. female with significant medical history of HLD, DM II presented to hospital as a transfer from [facility] for evaluation of R MCA stroke.  tPA not administered due to the patient presenting outside of the treatment window.  Imaging at the OSH revealed findings concerning for hyperdense R MCA sign.  On arrival to this facility the patient had dysarthria and right gaze preference.  She was also noted to have a right facial droop and mild right sided drift.  No endovascular intervention at this time.    Etiology: Cardioembolic versus atheroembolic     TTE showing 70% EF (Grade II diastolic dysfunction) with severe left atrial enlargement. Will obtain TCD bubble study to differentiate if clots are coming from the heart of the carotid arteries.     Antithrombotics for secondary stroke prevention: Antiplatelets: Aspirin: 81 mg daily However, TTE showing severe left atrial enlargement which is concerning for possible atrial fibrillation.     Statins for secondary stroke prevention and hyperlipidemia, if present:   Statins: Atorvastatin- 80 mg daily    Aggressive risk factor modification: DM, HLD, thyroid disease     Rehab efforts: The patient has been evaluated by a stroke team provider and the therapy needs have been fully considered based off the presenting complaints and exam findings. The following therapy evaluations are needed: PT evaluate and treat, OT evaluate and treat, SLP evaluate and treat    Diagnostics ordered/pending: TCD     VTE prophylaxis: Heparin 5000 units SQ every 8 hours    BP parameters: Infarct: No intervention, SBP <220

## 2020-09-29 NOTE — PROGRESS NOTES
[facility]  Vascular Neurology  Comprehensive Stroke Center  Progress Note    Assessment/Plan:     * Embolic stroke involving right middle cerebral artery  Su Dent is a 64 y.o. female with significant medical history of HLD, DM II presented to hospital as a transfer from [facility] for evaluation of R MCA stroke.  tPA not administered due to the patient presenting outside of the treatment window.  Imaging at the OSH revealed findings concerning for hyperdense R MCA sign.  On arrival to this facility the patient had dysarthria and right gaze preference.  She was also noted to have a right facial droop and mild right sided drift.  No endovascular intervention at this time.    Etiology: Cardioembolic versus atheroembolic     TTE showing 70% EF (Grade II diastolic dysfunction) with severe left atrial enlargement. Will obtain TCD bubble study to differentiate if clots are coming from the heart or the carotid arteries.     Antithrombotics for secondary stroke prevention: Antiplatelets: Aspirin: 81 mg daily However, TTE showing severe left atrial enlargement which is concerning for possible atrial fibrillation.     Statins for secondary stroke prevention and hyperlipidemia, if present:   Statins: Atorvastatin- 80 mg daily    Aggressive risk factor modification: DM, HLD, thyroid disease     Rehab efforts: The patient has been evaluated by a stroke team provider and the therapy needs have been fully considered based off the presenting complaints and exam findings. The following therapy evaluations are needed: PT evaluate and treat, OT evaluate and treat, SLP evaluate and treat    Diagnostics ordered/pending: TCD     VTE prophylaxis: Heparin 5000 units SQ every 8 hours    BP parameters: Infarct: No intervention, SBP <220        Cytotoxic cerebral edema  -Small area of cytotoxic cerebral edema identified when reviewing brain imaging in the territory of the R middle cerebral artery.  There is no mass effect associated with it. We will continue to monitor the patients clinical exam for any worsening of symptoms which may indicate expansion of the stroke or the area of the edema resulting in the clinical change. The pattern is suggestive of cardioembolic etiology.        Mixed hyperlipidemia  -Stroke risk factor.  LDL pending.  -Atorvastatin 40 mg daily.    Atrial fibrillation  One episode in the past of atrial fibrillation when patient was undergoing knee surgery  Placed on lopressor 12.5 mg BID     DM II (diabetes mellitus, type II), controlled  -Stroke risk factor.    -Goal glucose 140-180 for now.  -Hold home meds for now.  -Moderate correction SSI and QAC/HS accuchecks, titrate prn       09/28/2020 TTE showing 70% EF (Grade II diastolic dysfunction) with severe left atrial enlargement. MRI w/o contrast showing multiple areas of diffusion signal abnormality involving the right cerebral hemisphere  09/29/2020 Will obtain TCD bubble study to differentiate if emboli is coming from carotids or if it due to atrial fibrillation. Lopressor 12.5 mg BID started yesterday (home medications). Patient also would like to go to rehab near [place].     STROKE DOCUMENTATION   Acute Stroke Times   Symptom Onset Date: 09/26/20  Symptom Onset Time: 2215    NIH Scale:  1a. Level of Consciousness: 0-->Alert, keenly responsive  1b. LOC Questions: 0-->Answers both questions correctly  1c. LOC Commands: 0-->Performs both tasks correctly  2. Best Gaze: 0-->Normal  3. Visual: 0-->No visual loss  4. Facial Palsy: 1-->Minor paralysis (flattened nasolabial fold, asymmetry on smiling)  5a. Motor Arm, Left: 0-->No drift, limb holds 90 (or 45) degrees for full 10 secs  5b. Motor Arm, Right: 0-->No drift, limb holds 90 (or 45) degrees for full 10 secs  6a. Motor Leg, Left: 0-->No drift, leg holds 30 degree position for full 5 secs  6b. Motor Leg, Right: 0-->No drift, leg holds 30 degree position for full 5 secs  7.  Limb Ataxia: 0-->Absent  8. Sensory: 0-->Normal, no sensory loss  9. Best Language: 1-->Mild-to-moderate aphasia, some obvious loss of fluency or facility of comprehension, without significant limitation on ideas expressed or form of expression. Reduction of speech and/or comprehension, however, makes conversation. . . (see row details)  10. Dysarthria: 1-->Mild-to-moderate dysarthria, patient slurs at least some words and, at worst, can be understood with some difficulty  11. Extinction and Inattention (formerly Neglect): 0-->No abnormality  Total (NIH Stroke Scale): 3       Modified Sp Score: 1  [place] Coma Scale:    ABCD2 Score:    [id number] Score:   HAS -BLED Score:   ICH Score:   Hunt & Newell Classification:      Hemorrhagic change of an Ischemic Stroke: Does this patient have an ischemic stroke with hemorrhagic changes? No     Neurologic Chief Complaint: Right MCA stroke ( dysarthia, right gaze preference and a right facial droop)    Subjective:     Interval History: Patient is seen for follow-up neurological assessment and treatment recommendations:     Will obtain TCD bubble study to differentiate if emboli is coming from carotids or if it due to atrial fibrillation. Lopressor 12.5 mg BID started yesterday (home medications). Patient also would like to go to rehab near [place].     HPI, Past Medical, Family, and Social History remains the same as documented in the initial encounter.     Review of Systems    Scheduled Meds:   aspirin  81 mg Oral Daily    atorvastatin  40 mg Oral Daily    heparin (porcine)  5,000 Units Subcutaneous Q8H    metoprolol tartrate  12.5 mg Oral BID    psyllium husk (aspartame)  1 packet Oral Daily     Continuous Infusions:   sodium chloride 0.9%       PRN Meds:acetaminophen, bisacodyL, dextrose 50%, dextrose 50%, glucagon (human recombinant), glucose, glucose, insulin aspart U-100, labetaloL, ondansetron, ondansetron, sodium chloride 0.9%, sodium chloride  0.9%    Objective:     Vital Signs (Most Recent):  Temp: 98.2 °F (36.8 °C) (09/29/20 0930)  Pulse: 107 (09/29/20 0930)  Resp: 18 (09/29/20 0930)  BP: (!) 149/65 (09/29/20 0930)  SpO2: 97 % (09/29/20 0930)  BP Location: Right arm    Vital Signs Range (Last 24H):  Temp:  [97.8 °F (36.6 °C)-98.6 °F (37 °C)]   Pulse:  []   Resp:  [18]   BP: (116-156)/(53-67)   SpO2:  [83 %-97 %]   BP Location: Right arm    Physical Exam  Vitals signs and nursing note reviewed.   Constitutional:       General: She is not in acute distress.     Appearance: She is well-developed. She is obese. She is ill-appearing. She is not diaphoretic.   HENT:      Head: Normocephalic and atraumatic.      Right Ear: External ear normal.      Left Ear: External ear normal.      Nose: Nose normal.      Mouth/Throat:      Mouth: Mucous membranes are dry.   Eyes:      General: No scleral icterus.        Right eye: No discharge.         Left eye: No discharge.      Extraocular Movements:      Right eye: Abnormal extraocular motion present.      Left eye: Abnormal extraocular motion present.      Conjunctiva/sclera: Conjunctivae normal.      Pupils: Pupils are equal, round, and reactive to light.   Neck:      Musculoskeletal: Normal range of motion and neck supple.   Cardiovascular:      Rate and Rhythm: Normal rate and regular rhythm.   Pulmonary:      Effort: Pulmonary effort is normal. No respiratory distress.      Breath sounds: Normal breath sounds.   Abdominal:      General: Bowel sounds are decreased. There is no distension.      Palpations: Abdomen is soft.      Tenderness: There is no abdominal tenderness.   Musculoskeletal:      Right lower leg: No edema.      Left lower leg: No edema.   Skin:     General: Skin is warm and dry.      Capillary Refill: Capillary refill takes less than 2 seconds.   Neurological:      Mental Status: She is alert and oriented to person, place, and time.      Motor: Weakness present.         Neurological Exam:    LOC: alert  Attention Span: Good   Language: No aphasia  Articulation: No dysarthria  Orientation: Person, Place, Time   Visual Fields: Full  EOM (CN III, IV, VI): Full/intact  Pupils (CN II, III): PERRL  Facial Sensation (CN V): Normal  Facial Movement (CN VII): Lower facial weakness on the Left  Gag Reflex: present  Reflexes: 2+ throughout  Motor: Arm left  Paresis: 3/5  Leg left  Paresis: 4/5  Arm right  Paresis: 4/5  Leg right Paresis: 4/5  Cebellar: Axia/Trunk Instability/Ataxia   Sensation: Intact to light touch, temperature and vibration  Tone: Normal tone throughout    Laboratory:  CMP:   Recent Labs   Lab 09/29/20 0423   CALCIUM 8.6*   ALBUMIN 3.4*   PROT 6.5      K 3.6   CO2 23      BUN 12   CREATININE 0.6   ALKPHOS 104   ALT 13   AST 14   BILITOT 0.5     BMP:   Recent Labs   Lab 09/29/20 0423      K 3.6      CO2 23   BUN 12   CREATININE 0.6   CALCIUM 8.6*     CBC:   Recent Labs   Lab 09/29/20 0423   WBC 7.67   RBC 4.92   HGB 12.6   HCT 41.2      MCV 84   MCH 25.6*   MCHC 30.6*     Lipid Panel:   Recent Labs   Lab 09/27/20 0418   CHOL 155   LDLCALC 96.2   HDL 45   TRIG 69     Coagulation:   Recent Labs   Lab 09/28/20  0353   INR 1.0   APTT 25.5     Platelet Aggregation Study: No results for input(s): PLTAGG, PLTAGINTERP, PLTAGREGLACO, ADPPLTAGGREG in the last 168 hours.  Hgb A1C:   Recent Labs   Lab 09/27/20 0418   HGBA1C 6.5*     TSH:   Recent Labs   Lab 09/27/20 0418   TSH 0.947       Diagnostic Results     Brain imaging:  CTH 9/26/2020 (obtained at OSH) Findings suggestive of acute infarct in the right insular region, junction of the right temporal, frontal and parietal lobes and further within the right parietal lobe.  No significant mass effect or midline shift.  No acute intracranial hemorrhage.     CTH 9/27/2020 Hypodensity noted in the right MCA territory     MRI Brain      Multiple areas of diffusion signal abnormality involving the right cerebral hemisphere,  consistent with acute ischemia/infarct as discussed above.    Vessel Imaging:  CTA Head and Neck 9/27/2020 (obtained at OSH) extensive thrombus throughout the right cervical internal carotid artery with trace peripheral flow.  Left internal carotid artery is unremarkable.  Complete occlusion of the majority of the right ICA with mild reconstitution at the carotid terminus.  Complete occlusion of the inferior M2 segment of the right MCA.     Cardiac Evaluation:   TTE   70% EF (Grade II diastolic dysfunction) with severe left atrial enlargement.       Abhijit Jaquez MD  Comprehensive Stroke Center  Department of Vascular Neurology   [facility]

## 2020-09-29 NOTE — HOSPITAL COURSE
9/29: SLP diagnosis of Dysphagia, Dysarthria, Cognitive-Linguistic Impairment and Visio-Spatial Impairment. Passed bedside swallow evaluation.  SLP recommending mechanical soft diet and thin liquids.

## 2020-09-29 NOTE — CONSULTS
[facility]  Physical Medicine & Rehab  Consult Note    Patient Name: Su Dent  MRN: [id number]  Admission Date: 9/27/2020  Hospital Length of Stay: 2 days  Attending Physician: Arnoldo Kaye MD     Inpatient consult to Physical Medicine & Rehabilitation  Consult performed by: Maranda Cardoza NP  Consult requested by:  Arnoldo Kaye MD    Reason for Consult:  assess rehabilitation needs    Consults  Subjective:     Principal Problem: Embolic stroke involving right middle cerebral artery    HPI: Per chart review, Su Dent is a 64-year-old female with PMHx of .  Patient presented to [facility] on 9/27 from [facility] for evaluation of R MCA stroke .  CTH OSH revealed hyperdense R MCA sign. Not a tPA candidate. Per stroke team, R MCA territory infarct, likely artery to artery embolism in the setting extensive thrombus/occlusion ipsilateral ICA no amenable to intervention. Hospital course complicated by severe LA enlargement, L sided weakness, Dysphagia, Dysarthria, Cognitive-Linguistic Impairment and Visio-Spatial Impairment.     Functional History: [place] in a single story home with no steps to enter.  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    Hospital Course: 9/29: SLP diagnosis of Dysphagia, Dysarthria, Cognitive-Linguistic Impairment and Visio-Spatial Impairment. Passed bedside swallow evaluation.  SLP recommending mechanical soft diet and thin liquids.      Past Medical History:   Diagnosis Date    Diabetes mellitus     GERD (gastroesophageal reflux disease)     Liver disease     Skin cancer, basal cell     Thyroid disease      Past Surgical History:   Procedure Laterality Date    CHOLECYSTECTOMY  05/06/2016    HYSTERECTOMY      JOINT REPLACEMENT Right 11/13/2019    TONSILLECTOMY       Review of patient's allergies indicates:   Allergen Reactions    Adhesive     Codeine        Scheduled Medications:     aspirin  81 mg Oral Daily    atorvastatin  40 mg Oral Daily    heparin (porcine)  5,000 Units Subcutaneous Q8H    metoprolol tartrate  12.5 mg Oral BID    psyllium husk (aspartame)  1 packet Oral Daily       PRN Medications: acetaminophen, bisacodyL, dextrose 50%, dextrose 50%, glucagon (human recombinant), glucose, glucose, insulin aspart U-100, labetaloL, ondansetron, ondansetron, sodium chloride 0.9%, sodium chloride 0.9%    Family History     Family history is unknown by patient.        Tobacco Use    Smoking status: Never Smoker    Smokeless tobacco: Never Used   Substance and Sexual Activity    Alcohol use: Yes     Comment: rarely    Drug use: Never    Sexual activity: Not on file     Review of Systems   Constitutional: Positive for activity change. Negative for fatigue and fever.   HENT: Negative for trouble swallowing and voice change.    Eyes: Negative for photophobia and visual disturbance.   Respiratory: Negative for cough and shortness of breath.    Cardiovascular: Negative for chest pain and palpitations.   Gastrointestinal: Negative for nausea and vomiting.   Genitourinary: Negative for difficulty urinating and flank pain.   Musculoskeletal: Positive for gait problem. Negative for arthralgias.   Skin: Negative for color change and rash.   Neurological: Positive for facial asymmetry, speech difficulty and weakness.   Psychiatric/Behavioral: Positive for confusion. Negative for agitation.     Objective:     Vital Signs (Most Recent):  Temp: 98.2 °F (36.8 °C) (09/29/20 0930)  Pulse: 107 (09/29/20 0930)  Resp: 18 (09/29/20 0930)  BP: (!) 149/65 (09/29/20 0930)  SpO2: 97 % (09/29/20 0930)    Vital Signs (24h Range):  Temp:  [97.8 °F (36.6 °C)-99.1 °F (37.3 °C)] 98.2 °F (36.8 °C)  Pulse:  [] 107  Resp:  [18] 18  SpO2:  [83 %-99 %] 97 %  BP: (116-156)/(53-67) 149/65     Body mass index is 36.11 kg/m².    Physical Exam  Vitals signs reviewed.   Constitutional:       General: She is not in  acute distress.     Appearance: She is well-developed. She is obese.      Comments: Son at bedside   HENT:      Head: Normocephalic and atraumatic.   Eyes:      General:         Right eye: No discharge.         Left eye: No discharge.   Neck:      Musculoskeletal: Neck supple.   Cardiovascular:      Rate and Rhythm: Normal rate.   Pulmonary:      Effort: Pulmonary effort is normal. No respiratory distress.   Abdominal:      Palpations: Abdomen is soft.      Tenderness: There is no abdominal tenderness.   Musculoskeletal:         General: No deformity.   Skin:     General: Skin is warm and dry.   Neurological:      Mental Status: She is alert and oriented to person, place, and time.      Cranial Nerves: Dysarthria and facial asymmetry present.      Motor: Weakness present.      Comments: Follows commands    Psychiatric:         Behavior: Behavior normal.         Cognition and Memory: Cognition is impaired.       Diagnostic Results: Labs: Reviewed  CT: Reviewed  MRI: Reviewed    Assessment/Plan:     * Embolic stroke involving right middle cerebral artery  See hospital course for functional, cognitive/speech/language, and nutrition/swallow status.    Recommendations  -  Encourage mobility, OOB in chair, and early ambulation as appropriate   -  PT, OT, SLP evaluate and treat  -  Monitor mood and sleep disturbances and establish consistent sleep-wake cycle  -  Monitor for bowel and bladder dysfunction  -  Monitor for shoulder pain/subluxation and spasticity  -  DVT prophylaxis if appropriate  -  Monitor for and prevent skin breakdown and pressure ulcers  · Early mobility, repositioning/weight shifting every 20-30 minutes when sitting, turn patient every 2 hours, proper mattress/overlay and chair cushioning, pressure relief/heel protector boots    Mixed hyperlipidemia  --stroke risk factor    DM II (diabetes mellitus, type II), controlled  -stroke risk factor      PAC recommendation: Inpatient Rehab        Thank you for  your consult.     Maranda Cardoza NP  Department of Physical Medicine & Rehab  [facility]

## 2020-09-29 NOTE — PLAN OF CARE
Plan of Care:  Discharge Recommendation: Rehab.  Please continue Progressive Mobility Protocol as appropriate.  Appropriate transfer level with nursing staff: Safe to transfer to bedside chair/bedside commode: squat pivot to patient's right with 1 person.    Radha Romero PT, DPT  2020  Pager: [phone]    Physical Therapy Treatment Goals:  Multidisciplinary Problems       Physical Therapy Goals          Problem: Physical Therapy Goal    Goal Priority Disciplines Outcome Goal Variances Interventions   Physical Therapy Goal     PT, PT/OT Ongoing, Progressing     Description: Goals to be met by: 10/4/2020    Patient will increase functional independence with mobility by performin. Supine <> sit with Supervision.  2. Sit <> stand transfer with Contact Guard Assistance using No Assistive Device and Rolling Walker.  3. Bed <> chair transfer via Stand Pivot with Contact Guard Assistance using No Assistive Device and Rolling Walker.  4. Gait  x 50 feet with Contact Guard Assistance using No Assistive Device and Rolling Walker to prepare for community ambulation and endurance activities.  5. Dynamic standing for 8 minutes with Contact Guard Assistance using No Assistive Device to prepare for functional tasks in standing.  6. Able to tolerate exercise for 15-20 reps with independence.

## 2020-09-29 NOTE — PLAN OF CARE
POC and education reviewed with patient and son at bedside. All questions and concerns answered at this time. VSS on room air. Patient stand/pivot to commode with x1 staff assist. Patient denies pain. BG monitored, no coverage needed. Patient reports decreased appetite and loose bm's. Patient remains free from fall or injury. Fall risk precautions in place. See flowsheets for full assessment. WCTM.      Problem: Adjustment to Illness (Stroke, Ischemic/Transient Ischemic Attack)  Goal: Optimal Coping  Outcome: Ongoing, Progressing     Problem: Bowel Elimination Impaired (Stroke, Ischemic/Transient Ischemic Attack)  Goal: Effective Bowel Elimination  Outcome: Ongoing, Progressing     Problem: Pain (Stroke, Ischemic/Transient Ischemic Attack)  Goal: Acceptable Pain Control  Outcome: Ongoing, Progressing     Problem: Urinary Elimination Impaired (Stroke, Ischemic/Transient Ischemic Attack)  Goal: Effective Urinary Elimination  Outcome: Ongoing, Progressing     Problem: Fall Injury Risk  Goal: Absence of Fall and Fall-Related Injury  Outcome: Ongoing, Progressing     Problem: Adult Inpatient Plan of Care  Goal: Patient-Specific Goal (Individualization)  Outcome: Ongoing, Progressing     Problem: Diabetes Comorbidity  Goal: Blood Glucose Level Within Desired Range  Outcome: Ongoing, Progressing     Problem: Skin Injury Risk Increased  Goal: Skin Health and Integrity  Outcome: Ongoing, Progressing

## 2020-09-29 NOTE — ASSESSMENT & PLAN NOTE
One episode in the past of atrial fibrillation when patient was undergoing knee surgery  Placed on lopressor 12.5 mg BID

## 2020-09-29 NOTE — ASSESSMENT & PLAN NOTE
See hospital course for functional, cognitive/speech/language, and nutrition/swallow status.    Recommendations  -  Encourage mobility, OOB in chair, and early ambulation as appropriate   -  PT, OT, SLP evaluate and treat  -  Monitor mood and sleep disturbances and establish consistent sleep-wake cycle  -  Monitor for bowel and bladder dysfunction  -  Monitor for shoulder pain/subluxation and spasticity  -  DVT prophylaxis if appropriate  -  Monitor for and prevent skin breakdown and pressure ulcers  · Early mobility, repositioning/weight shifting every 20-30 minutes when sitting, turn patient every 2 hours, proper mattress/overlay and chair cushioning, pressure relief/heel protector boots

## 2020-09-29 NOTE — HPI
Per chart review, Su Dent is a 64-year-old female with PMHx of .  Patient presented to [facility] on 9/27 from [facility] for evaluation of R MCA stroke .  CTH OSH revealed hyperdense R MCA sign. Not a tPA candidate. Per stroke team, R MCA territory infarct, likely artery to artery embolism in the setting extensive thrombus/occlusion ipsilateral ICA no amenable to intervention. Hospital course complicated by severe LA enlargement, L sided weakness, Dysphagia, Dysarthria, Cognitive-Linguistic Impairment and Visio-Spatial Impairment.     Functional History: [place] in [place] story home with no steps to enter.  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

## 2020-09-29 NOTE — PLAN OF CARE
Problem: Adult Inpatient Plan of Care  Goal: Plan of Care Review  Outcome: Ongoing, Progressing     Problem: Functional Ability Impaired (Stroke, Ischemic/Transient Ischemic Attack)  Goal: Optimal Functional Ability  Outcome: Ongoing, Progressing  Intervention: Optimize Functional Ability  Flowsheets (Taken 9/29/2020 0672)  Self-Care Promotion:   independence encouraged   BADL personal objects within reach     Reviewed POC and addressed all comments/concerns with pt and family. Fall precautions in place. SCDs on. Administered tylenol prn for headache. VS and assessments in flowsheets. WCTM

## 2020-09-29 NOTE — PLAN OF CARE
POC and education reviewed with patient and son at bedside. Stroke packet at bedside, reviewed with patient and family. All questions and concerns answered at this time. VSS on room air. Patient stand/pivot to commode with x1 staff assist. Patient denies pain. BG monitored, no coverage needed. Patient reports decreased appetite. Patient remains free from fall or injury. Fall risk precautions in place. See flowsheets for full assessment. WCTM.    Problem: Adjustment to Illness (Stroke, Ischemic/Transient Ischemic Attack)  Goal: Optimal Coping  Outcome: Ongoing, Progressing     Problem: Bowel Elimination Impaired (Stroke, Ischemic/Transient Ischemic Attack)  Goal: Effective Bowel Elimination  Outcome: Ongoing, Progressing     Problem: Cerebral Tissue Perfusion Risk (Stroke, Ischemic/Transient Ischemic Attack)  Goal: Optimal Cerebral Tissue Perfusion  Outcome: Ongoing, Progressing     Problem: Eating/Swallowing Impairment (Stroke, Ischemic/Transient Ischemic Attack)  Goal: Oral Intake without Aspiration  Outcome: Ongoing, Progressing     Problem: Pain (Stroke, Ischemic/Transient Ischemic Attack)  Goal: Acceptable Pain Control  Outcome: Ongoing, Progressing     Problem: Fall Injury Risk  Goal: Absence of Fall and Fall-Related Injury  Outcome: Ongoing, Progressing     Problem: Adult Inpatient Plan of Care  Goal: Plan of Care Review  Outcome: Ongoing, Progressing

## 2020-09-30 LAB
POCT GLUCOSE: 105 MG/DL (ref 70–110)
POCT GLUCOSE: 108 MG/DL (ref 70–110)
POCT GLUCOSE: 141 MG/DL (ref 70–110)

## 2020-09-30 PROCEDURE — 99233 SBSQ HOSP IP/OBS HIGH 50: CPT | Mod: ,,, | Performed by: PSYCHIATRY & NEUROLOGY

## 2020-09-30 PROCEDURE — 97535 SELF CARE MNGMENT TRAINING: CPT

## 2020-09-30 PROCEDURE — 99233 PR SUBSEQUENT HOSPITAL CARE,LEVL III: ICD-10-PCS | Mod: ,,, | Performed by: PSYCHIATRY & NEUROLOGY

## 2020-09-30 PROCEDURE — 25000003 PHARM REV CODE 250: Performed by: NURSE PRACTITIONER

## 2020-09-30 PROCEDURE — 92507 TX SP LANG VOICE COMM INDIV: CPT

## 2020-09-30 PROCEDURE — 25000003 PHARM REV CODE 250: Performed by: STUDENT IN AN ORGANIZED HEALTH CARE EDUCATION/TRAINING PROGRAM

## 2020-09-30 PROCEDURE — 11000001 HC ACUTE MED/SURG PRIVATE ROOM

## 2020-09-30 PROCEDURE — 63600175 PHARM REV CODE 636 W HCPCS: Performed by: STUDENT IN AN ORGANIZED HEALTH CARE EDUCATION/TRAINING PROGRAM

## 2020-09-30 RX ORDER — AZELASTINE 1 MG/ML
2 SPRAY, METERED NASAL 2 TIMES DAILY
COMMUNITY

## 2020-09-30 RX ADMIN — METOPROLOL TARTRATE 12.5 MG: 25 TABLET, FILM COATED ORAL at 09:09

## 2020-09-30 RX ADMIN — ATORVASTATIN CALCIUM 40 MG: 20 TABLET, FILM COATED ORAL at 10:09

## 2020-09-30 RX ADMIN — LEVOTHYROXINE SODIUM 88 MCG: 88 TABLET ORAL at 05:09

## 2020-09-30 RX ADMIN — HEPARIN SODIUM 5000 UNITS: 5000 INJECTION INTRAVENOUS; SUBCUTANEOUS at 02:09

## 2020-09-30 RX ADMIN — PANTOPRAZOLE SODIUM 40 MG: 40 TABLET, DELAYED RELEASE ORAL at 10:09

## 2020-09-30 RX ADMIN — APIXABAN 5 MG: 5 TABLET, FILM COATED ORAL at 09:09

## 2020-09-30 RX ADMIN — HEPARIN SODIUM 5000 UNITS: 5000 INJECTION INTRAVENOUS; SUBCUTANEOUS at 05:09

## 2020-09-30 RX ADMIN — METOPROLOL TARTRATE 12.5 MG: 25 TABLET, FILM COATED ORAL at 10:09

## 2020-09-30 RX ADMIN — ACETAMINOPHEN 650 MG: 325 TABLET ORAL at 11:09

## 2020-09-30 RX ADMIN — ASPIRIN 81 MG: 81 TABLET, COATED ORAL at 10:09

## 2020-09-30 NOTE — PLAN OF CARE
POC reviewed with pt. Pt verbalized understanding. Questions and concerns addressed. No acute events overnight. Pt AAOx4, moves all extremities spontaneously, yet exhibits hemiparesis on LUE/LLE, and denies any numbness or tingling. Pt c/o nausea during the shift, prn ondansetron given per MAR with full relief obtained. Pt on room air with no s/s of distress noted. Stroke booklet at bedside. Fall/safety precautions maintained. Bed locked and in lowest position with call light within reach. See flowsheets for full assessment and VS information.       Problem: Adjustment to Illness (Stroke, Ischemic/Transient Ischemic Attack)  Goal: Optimal Coping  Outcome: Ongoing, Progressing     Problem: Fall Injury Risk  Goal: Absence of Fall and Fall-Related Injury  Outcome: Ongoing, Progressing     Problem: Adult Inpatient Plan of Care  Goal: Plan of Care Review  Outcome: Ongoing, Progressing     Problem: Skin Injury Risk Increased  Goal: Skin Health and Integrity  Outcome: Ongoing, Progressing

## 2020-09-30 NOTE — PLAN OF CARE
Problem: SLP Goal  Goal: SLP Goal  Description: Speech Language Pathology Goals  Goals expected to be met by 10/5:  1. Pt will tolerate a mechanical soft diet and thin liquids without s/s of aspiration.   2. Pt will participate in ongoing swallowing assessment to determine if/when appropriate for further diet advancement.   3. Pt will perform OME's x 10 to increase oral motor strength and ROM.   4. Pt will participate in speech/language/cognitive evaluation. Goal met/initiated 9/28  ADDITIONAL GOALS:  4. Pt will recall 3/3 motor speech strategies.  5. Pt will utilize motor speech strategies during connected speech to improve speech intelligibility to 90% given min cues.   6. Pt will participate in evaluation of reading, writing, visual spatial, and functional math abilities. Writing and visual spatial abilities assessed 9/29. Left neglect evident. Waiting for reading glasses to complete reading addessment.         Outcome: Ongoing, Progressing  Pt making progress towards meeting goals. Cont POC.   ALONZO Corrigan, CCC-SLP  Speech Language Pathologist  [phone]  9/30/2020

## 2020-09-30 NOTE — PROGRESS NOTES
Physical Therapy  Continue Current Plan of Care     Patient Name:  Su Dent   MRN:  [id number]  Admitting Diagnosis:  Embolic stroke involving right middle cerebral artery   Recent Surgery: * No surgery found *    Admit Date: 9/27/2020  Length of Stay: 3 days    Patient not seen on this date, however per chart review pt continues to benefit from acute PT services. PT will continue to follow and progress pt's plan of care and goals. Please continue progressive mobility as appropriate.    Discharge Disposition Recommendation: Rehab    Leah Thacker, PT, DPT  9/30/2020

## 2020-09-30 NOTE — PLAN OF CARE
Problem: Bowel Elimination Impaired (Stroke, Ischemic/Transient Ischemic Attack)  Goal: Effective Bowel Elimination  Outcome: Ongoing, Progressing     Problem: Skin Injury Risk Increased  Goal: Skin Health and Integrity  Outcome: Ongoing, Progressing   Patient aaox3, bed bound, vss, c/o frequent bm, q2 turn, POC explained patient verbalized understanding son at bed side will cont to monitor.

## 2020-09-30 NOTE — PROGRESS NOTES
[facility]  Vascular Neurology  Comprehensive Stroke Center  Progress Note    Assessment/Plan:     * Embolic stroke involving right middle cerebral artery  Su Dent is a 64 y.o. female with significant medical history of HLD, DM II presented to hospital as a transfer from [facility] for evaluation of R MCA stroke.  tPA not administered due to the patient presenting outside of the treatment window.  Imaging at the OSH revealed findings concerning for hyperdense R MCA sign.  On arrival to this facility the patient had dysarthria and right gaze preference.  She was also noted to have a right facial droop and mild right sided drift.  No endovascular intervention at this time.    Etiology: Cardioembolic versus atheroembolic     TTE showing 70% EF (Grade II diastolic dysfunction) with severe left atrial enlargement. TCD bubble study showing emboli from the right and left. Due to this she will be on asa and apixiban     Antithrombotics for secondary stroke prevention: Antiplatelets: Aspirin: 81 mg daily and apixiban 5 mg BID   Statins for secondary stroke prevention and hyperlipidemia, if present:   Statins: Atorvastatin- 80 mg daily    Aggressive risk factor modification: DM, HLD, thyroid disease     Rehab efforts: The patient has been evaluated by a stroke team provider and the therapy needs have been fully considered based off the presenting complaints and exam findings. The following therapy evaluations are needed: PT evaluate and treat, OT evaluate and treat, SLP evaluate and treat    Diagnostics ordered/pending: None      VTE prophylaxis: Already anticoagulated     BP parameters: Infarct: No intervention, SBP <220        Cytotoxic cerebral edema  -Small area of cytotoxic cerebral edema identified when reviewing brain imaging in the territory of the R middle cerebral artery. There is no mass effect associated with it. We will continue to monitor the patients clinical exam for  any worsening of symptoms which may indicate expansion of the stroke or the area of the edema resulting in the clinical change. The pattern is suggestive of cardioembolic etiology.        Mixed hyperlipidemia  -Stroke risk factor.  LDL pending.  -Atorvastatin 40 mg daily.    Atrial fibrillation  One episode in the past of atrial fibrillation when patient was undergoing knee surgery  Placed on lopressor 12.5 mg BID     DM II (diabetes mellitus, type II), controlled  -Stroke risk factor.    -Goal glucose 140-180 for now.  -Hold home meds for now.  -Moderate correction SSI and QAC/HS accuchecks, titrate prn         09/28/2020 TTE showing 70% EF (Grade II diastolic dysfunction) with severe left atrial enlargement. MRI w/o contrast showing multiple areas of diffusion signal abnormality involving the right cerebral hemisphere  09/29/2020 Will obtain TCD bubble study to differentiate if emboli is coming from carotids or if it due to atrial fibrillation. Lopressor 12.5 mg BID started yesterday (home medications). Patient also would like to go to rehab near [place].   09/30/2020 TCD showing emboli count in the R:20 and emboli count in the left 109. Will start patient on apixiban given her severe atrial enlargement. Otherwise neurologic exam is unchanged.     STROKE DOCUMENTATION   Acute Stroke Times   Symptom Onset Date: 09/26/20  Symptom Onset Time: 2215    NIH Scale:  1a. Level of Consciousness: 0-->Alert, keenly responsive  1b. LOC Questions: 0-->Answers both questions correctly  1c. LOC Commands: 0-->Performs both tasks correctly  2. Best Gaze: 0-->Normal  3. Visual: 0-->No visual loss  4. Facial Palsy: 1-->Minor paralysis (flattened nasolabial fold, asymmetry on smiling)  5a. Motor Arm, Left: 0-->No drift, limb holds 90 (or 45) degrees for full 10 secs  5b. Motor Arm, Right: 0-->No drift, limb holds 90 (or 45) degrees for full 10 secs  6a. Motor Leg, Left: 0-->No drift, leg holds 30 degree position for full 5  secs  6b. Motor Leg, Right: 0-->No drift, leg holds 30 degree position for full 5 secs  7. Limb Ataxia: 0-->Absent  8. Sensory: 0-->Normal, no sensory loss  9. Best Language: 1-->Mild-to-moderate aphasia, some obvious loss of fluency or facility of comprehension, without significant limitation on ideas expressed or form of expression. Reduction of speech and/or comprehension, however, makes conversation. . . (see row details)  10. Dysarthria: 1-->Mild-to-moderate dysarthria, patient slurs at least some words and, at worst, can be understood with some difficulty  11. Extinction and Inattention (formerly Neglect): 0-->No abnormality  Total (NIH Stroke Scale): 3       Modified Sp Score: 1  Ambrose Coma Scale:15   ABCD2 Score:    [id number] Score:   HAS -BLED Score:   ICH Score:   Hunt & Newell Classification:      Hemorrhagic change of an Ischemic Stroke: Does this patient have an ischemic stroke with hemorrhagic changes? No     Neurologic Chief Complaint: Right MCA stroke ( dysarthia, right gaze preference and a right facial droop)    Subjective:     Interval History: Patient is seen for follow-up neurological assessment and treatment recommendations:     TCD showing emboli count in the R:20 and emboli count in the left 109. Will start patient on apixiban given her severe atrial enlargement. Otherwise neurologic exam is unchanged. Waiting for placement.     She will get a 30 day event at discharge     HPI, Past Medical, Family, and Social History remains the same as documented in the initial encounter.     Review of Systems   Constitutional: Negative for activity change, fatigue and fever.   HENT: Negative for trouble swallowing and voice change.    Eyes: Negative for photophobia and visual disturbance.   Respiratory: Negative for cough and shortness of breath.    Cardiovascular: Negative for chest pain and palpitations.   Gastrointestinal: Negative for nausea and vomiting.   Genitourinary: Negative for difficulty  urinating and flank pain.   Musculoskeletal: Positive for gait problem. Negative for arthralgias.   Skin: Negative for color change and rash.   Neurological: Positive for facial asymmetry, speech difficulty and weakness.   Psychiatric/Behavioral: Negative for agitation and confusion.     Scheduled Meds:   apixaban  5 mg Oral BID    aspirin  81 mg Oral Daily    atorvastatin  40 mg Oral Daily    levothyroxine  88 mcg Oral Before breakfast    metoprolol tartrate  12.5 mg Oral BID    pantoprazole  40 mg Oral Daily    psyllium husk (aspartame)  1 packet Oral Daily     Continuous Infusions:   sodium chloride 0.9%       PRN Meds:acetaminophen, bisacodyL, dextrose 50%, dextrose 50%, glucagon (human recombinant), glucose, glucose, insulin aspart U-100, labetaloL, ondansetron, ondansetron, sodium chloride 0.9%, sodium chloride 0.9%    Objective:     Vital Signs (Most Recent):  Temp: 96.8 °F (36 °C) (09/30/20 1301)  Pulse: 65 (09/30/20 1500)  Resp: 16 (09/30/20 1301)  BP: 131/69 (09/30/20 0545)  SpO2: 96 % (09/30/20 0545)  BP Location: Left arm    Vital Signs Range (Last 24H):  Temp:  [96.8 °F (36 °C)-98.2 °F (36.8 °C)]   Pulse:  [60-78]   Resp:  [11-16]   BP: (130-151)/(66-69)   SpO2:  [95 %-98 %]   BP Location: Left arm    Physical Exam  Vitals signs and nursing note reviewed.   Constitutional:       General: She is not in acute distress.     Appearance: She is well-developed. She is obese. She is ill-appearing. She is not diaphoretic.   HENT:      Head: Normocephalic and atraumatic.      Right Ear: External ear normal.      Left Ear: External ear normal.      Nose: Nose normal.      Mouth/Throat:      Mouth: Mucous membranes are dry.   Eyes:      General: No scleral icterus.        Right eye: No discharge.         Left eye: No discharge.      Conjunctiva/sclera: Conjunctivae normal.      Pupils: Pupils are equal, round, and reactive to light.   Neck:      Musculoskeletal: Normal range of motion and neck supple.    Cardiovascular:      Rate and Rhythm: Normal rate and regular rhythm.   Pulmonary:      Effort: Pulmonary effort is normal. No respiratory distress.      Breath sounds: Normal breath sounds.   Abdominal:      General: Bowel sounds are decreased. There is no distension.      Palpations: Abdomen is soft.      Tenderness: There is no abdominal tenderness.   Musculoskeletal:      Right lower leg: No edema.      Left lower leg: No edema.   Skin:     General: Skin is warm and dry.      Capillary Refill: Capillary refill takes less than 2 seconds.   Neurological:      Mental Status: She is alert and oriented to person, place, and time.      Motor: Weakness present.         Neurological Exam:   LOC: alert  Attention Span: Good   Language: No aphasia  Articulation: No dysarthria  Orientation: Person, Place, Time   Visual Fields: Full  EOM (CN III, IV, VI): Full/intact  Pupils (CN II, III): PERRL  Facial Sensation (CN V): Normal  Facial Movement (CN VII): Lower facial weakness on the Left  Gag Reflex: present  Reflexes: 2+ throughout  Motor: Arm left  Paresis: 3/5  Leg left  Paresis: 4/5  Arm right  Paresis: 4/5  Leg right Paresis: 4/5  Cebellar: Axia/Trunk Instability/Ataxia   Sensation: Intact to light touch, temperature and vibration  Tone: Normal tone throughout    Laboratory:  CMP:   No results for input(s): GLUCOSE, CALCIUM, ALBUMIN, PROT, NA, K, CO2, CL, BUN, CREATININE, ALKPHOS, ALT, AST, BILITOT in the last 24 hours.  BMP:   Recent Labs   Lab 09/29/20  0423      K 3.6      CO2 23   BUN 12   CREATININE 0.6   CALCIUM 8.6*     CBC:   Recent Labs   Lab 09/29/20  0423   WBC 7.67   RBC 4.92   HGB 12.6   HCT 41.2      MCV 84   MCH 25.6*   MCHC 30.6*     Lipid Panel:   Recent Labs   Lab 09/27/20  0418   CHOL 155   LDLCALC 96.2   HDL 45   TRIG 69     Coagulation:   Recent Labs   Lab 09/28/20  0353   INR 1.0   APTT 25.5     Platelet Aggregation Study: No results for input(s): PLTAGG, PLTAGINTERP,  PLTAGREGLACO, ADPPLTAGGREG in the last 168 hours.  Hgb A1C:   Recent Labs   Lab 09/27/20 0418   HGBA1C 6.5*     TSH:   Recent Labs   Lab 09/27/20 0418   TSH 0.947       Diagnostic Results     Brain imaging:  CTH 9/26/2020 (obtained at OSH) Findings suggestive of acute infarct in the right insular region, junction of the right temporal, frontal and parietal lobes and further within the right parietal lobe.  No significant mass effect or midline shift.  No acute intracranial hemorrhage.     CTH 9/27/2020 Hypodensity noted in the right MCA territory     MRI Brain      Multiple areas of diffusion signal abnormality involving the right cerebral hemisphere, consistent with acute ischemia/infarct as discussed above.    Vessel Imaging:  CTA Head and Neck 9/27/2020 (obtained at OSH) extensive thrombus throughout the right cervical internal carotid artery with trace peripheral flow.  Left internal carotid artery is unremarkable.  Complete occlusion of the majority of the right ICA with mild reconstitution at the carotid terminus.  Complete occlusion of the inferior M2 segment of the right MCA.     Cardiac Evaluation:   TTE   70% EF (Grade II diastolic dysfunction) with severe left atrial enlargement.       Abhijit Jaquez MD  Comprehensive Stroke Center  Department of Vascular Neurology   [facility]

## 2020-09-30 NOTE — SUBJECTIVE & OBJECTIVE
Neurologic Chief Complaint: Right MCA stroke ( dysarthia, right gaze preference and a right facial droop)    Subjective:     Interval History: Patient is seen for follow-up neurological assessment and treatment recommendations:     TCD showing emboli count in the R:20 and emboli count in the left 109. Will start patient on apixiban given her severe atrial enlargement. Otherwise neurologic exam is unchanged. Waiting for placement.     She will get a 30 day event at discharge     HPI, Past Medical, Family, and Social History remains the same as documented in the initial encounter.     Review of Systems   Constitutional: Negative for activity change, fatigue and fever.   HENT: Negative for trouble swallowing and voice change.    Eyes: Negative for photophobia and visual disturbance.   Respiratory: Negative for cough and shortness of breath.    Cardiovascular: Negative for chest pain and palpitations.   Gastrointestinal: Negative for nausea and vomiting.   Genitourinary: Negative for difficulty urinating and flank pain.   Musculoskeletal: Positive for gait problem. Negative for arthralgias.   Skin: Negative for color change and rash.   Neurological: Positive for facial asymmetry, speech difficulty and weakness.   Psychiatric/Behavioral: Negative for agitation and confusion.     Scheduled Meds:   apixaban  5 mg Oral BID    aspirin  81 mg Oral Daily    atorvastatin  40 mg Oral Daily    levothyroxine  88 mcg Oral Before breakfast    metoprolol tartrate  12.5 mg Oral BID    pantoprazole  40 mg Oral Daily    psyllium husk (aspartame)  1 packet Oral Daily     Continuous Infusions:   sodium chloride 0.9%       PRN Meds:acetaminophen, bisacodyL, dextrose 50%, dextrose 50%, glucagon (human recombinant), glucose, glucose, insulin aspart U-100, labetaloL, ondansetron, ondansetron, sodium chloride 0.9%, sodium chloride 0.9%    Objective:     Vital Signs (Most Recent):  Temp: 96.8 °F (36 °C) (09/30/20 1301)  Pulse: 65  (09/30/20 1500)  Resp: 16 (09/30/20 1301)  BP: 131/69 (09/30/20 0545)  SpO2: 96 % (09/30/20 0545)  BP Location: Left arm    Vital Signs Range (Last 24H):  Temp:  [96.8 °F (36 °C)-98.2 °F (36.8 °C)]   Pulse:  [60-78]   Resp:  [11-16]   BP: (130-151)/(66-69)   SpO2:  [95 %-98 %]   BP Location: Left arm    Physical Exam  Vitals signs and nursing note reviewed.   Constitutional:       General: She is not in acute distress.     Appearance: She is well-developed. She is obese. She is ill-appearing. She is not diaphoretic.   HENT:      Head: Normocephalic and atraumatic.      Right Ear: External ear normal.      Left Ear: External ear normal.      Nose: Nose normal.      Mouth/Throat:      Mouth: Mucous membranes are dry.   Eyes:      General: No scleral icterus.        Right eye: No discharge.         Left eye: No discharge.      Conjunctiva/sclera: Conjunctivae normal.      Pupils: Pupils are equal, round, and reactive to light.   Neck:      Musculoskeletal: Normal range of motion and neck supple.   Cardiovascular:      Rate and Rhythm: Normal rate and regular rhythm.   Pulmonary:      Effort: Pulmonary effort is normal. No respiratory distress.      Breath sounds: Normal breath sounds.   Abdominal:      General: Bowel sounds are decreased. There is no distension.      Palpations: Abdomen is soft.      Tenderness: There is no abdominal tenderness.   Musculoskeletal:      Right lower leg: No edema.      Left lower leg: No edema.   Skin:     General: Skin is warm and dry.      Capillary Refill: Capillary refill takes less than 2 seconds.   Neurological:      Mental Status: She is alert and oriented to person, place, and time.      Motor: Weakness present.         Neurological Exam:   LOC: alert  Attention Span: Good   Language: No aphasia  Articulation: No dysarthria  Orientation: Person, Place, Time   Visual Fields: Full  EOM (CN III, IV, VI): Full/intact  Pupils (CN II, III): PERRL  Facial Sensation (CN V):  Normal  Facial Movement (CN VII): Lower facial weakness on the Left  Gag Reflex: present  Reflexes: 2+ throughout  Motor: Arm left  Paresis: 3/5  Leg left  Paresis: 4/5  Arm right  Paresis: 4/5  Leg right Paresis: 4/5  Cebellar: Axia/Trunk Instability/Ataxia   Sensation: Intact to light touch, temperature and vibration  Tone: Normal tone throughout    Laboratory:  CMP:   No results for input(s): GLUCOSE, CALCIUM, ALBUMIN, PROT, NA, K, CO2, CL, BUN, CREATININE, ALKPHOS, ALT, AST, BILITOT in the last 24 hours.  BMP:   Recent Labs   Lab 09/29/20 0423      K 3.6      CO2 23   BUN 12   CREATININE 0.6   CALCIUM 8.6*     CBC:   Recent Labs   Lab 09/29/20 0423   WBC 7.67   RBC 4.92   HGB 12.6   HCT 41.2      MCV 84   MCH 25.6*   MCHC 30.6*     Lipid Panel:   Recent Labs   Lab 09/27/20 0418   CHOL 155   LDLCALC 96.2   HDL 45   TRIG 69     Coagulation:   Recent Labs   Lab 09/28/20  0353   INR 1.0   APTT 25.5     Platelet Aggregation Study: No results for input(s): PLTAGG, PLTAGINTERP, PLTAGREGLACO, ADPPLTAGGREG in the last 168 hours.  Hgb A1C:   Recent Labs   Lab 09/27/20 0418   HGBA1C 6.5*     TSH:   Recent Labs   Lab 09/27/20 0418   TSH 0.947       Diagnostic Results     Brain imaging:  CTH 9/26/2020 (obtained at OSH) Findings suggestive of acute infarct in the right insular region, junction of the right temporal, frontal and parietal lobes and further within the right parietal lobe.  No significant mass effect or midline shift.  No acute intracranial hemorrhage.     CTH 9/27/2020 Hypodensity noted in the right MCA territory     MRI Brain      Multiple areas of diffusion signal abnormality involving the right cerebral hemisphere, consistent with acute ischemia/infarct as discussed above.    Vessel Imaging:  CTA Head and Neck 9/27/2020 (obtained at OSH) extensive thrombus throughout the right cervical internal carotid artery with trace peripheral flow.  Left internal carotid artery is unremarkable.   Complete occlusion of the majority of the right ICA with mild reconstitution at the carotid terminus.  Complete occlusion of the inferior M2 segment of the right MCA.     Cardiac Evaluation:   TTE   70% EF (Grade II diastolic dysfunction) with severe left atrial enlargement.

## 2020-09-30 NOTE — PT/OT/SLP PROGRESS
"Speech Language Pathology Treatment    Patient Name:  Su Dent   MRN:  [id number]  Admitting Diagnosis: Embolic stroke involving right middle cerebral artery    Recommendations:                 General Recommendations:  Dysphagia therapy, Speech/language therapy and Cognitive-linguistic therapy  Diet recommendations:  Mechanical soft, Liquid Diet Level: Thin   Aspiration Precautions: 1 bite/sip at a time, Alternating bites/sips, Avoid talking while eating, Check for pocketing/oral residue, HOB to 90 degrees, Monitor for s/s of aspiration, Small bites/sips and Strict aspiration precautions   General Precautions: Standard, aspiration, fall, vision impaired(left neglect)  Communication strategies:  encourage use of motor speech strategies    Subjective     "I do a lot of reading at the computer."    Pain/Comfort:  · Pain Rating 1: 0/10    Objective:     Has the patient been evaluated by SLP for swallowing?   Yes  Keep patient NPO? No   Current Respiratory Status: room air      Pt seen for bedside therapy with youngest son (Onofre) present.  Pt appearing stronger with improved endurance on this service date. Pt performed OME's x 10+ with excellent effort and improving endurance.  Improved labial seal and lingual ROM and strength noted.  Reading glasses present today. Pt completed paragraph level reading tasks.  Left neglect evident with omission of words and phrases on left side.  Attention to left side of paragraph improved significantly once a highlighted visual aid was placed in left margin.  Pt read 2 short paragraphs with good ability to visual aid.  For 3rd paragraph, highlighted visual aid was not placed.  Pt required min assistance for left attention.  Ongoing education provided to pt and son regarding effects of stroke, stroke recovery and neuroplasticity, dysarthria, speech intelligibility, compensatory speech strategies, oral motor exercises, left neglect, compensatory strategies for overcoming left " neglect, progress and improvements, activities and exercises for independent use, and SLP treatment and POC.  Pt and son expressed understanding.     Assessment:     Su Dent is a 64 y.o. female with an SLP diagnosis of Dysphagia, Dysarthria, Cognitive-Linguistic Impairment and Visio-Spatial Impairment.      Goals:   Multidisciplinary Problems     SLP Goals        Problem: SLP Goal    Goal Priority Disciplines Outcome   SLP Goal     SLP Ongoing, Progressing   Description: Speech Language Pathology Goals  Goals expected to be met by 10/5:  1. Pt will tolerate a mechanical soft diet and thin liquids without s/s of aspiration.   2. Pt will participate in ongoing swallowing assessment to determine if/when appropriate for further diet advancement.   3. Pt will perform OME's x 10 to increase oral motor strength and ROM.   4. Pt will participate in speech/language/cognitive evaluation. Goal met/initiated 9/28  ADDITIONAL GOALS:  4. Pt will recall 3/3 motor speech strategies.  5. Pt will utilize motor speech strategies during connected speech to improve speech intelligibility to 90% given min cues.   6. Pt will participate in evaluation of reading, writing, visual spatial, and functional math abilities. Writing and visual spatial abilities assessed 9/29. Left neglect evident. Waiting for reading glasses to complete reading addessment.                            Plan:     · Patient to be seen:  5 x/week   · Plan of Care expires:  10/27/20  · Plan of Care reviewed with:  patient, son   · SLP Follow-Up:  Yes       Discharge recommendations:  rehabilitation facility     Time Tracking:     SLP Treatment Date:   09/30/20  Speech Start Time:  1442  Speech Stop Time:  1506     Speech Total Time (min):  24 min    Billable Minutes: Speech Therapy Individual 14 and Seld Care/Home Management Training 10    ALONZO Corrigan, SHALINI-SLP  09/30/2020     ALONZO Corrigan, CCC-SLP  Speech Language Pathologist  [phone]  9/30/2020

## 2020-10-01 VITALS
DIASTOLIC BLOOD PRESSURE: 58 MMHG | BODY MASS INDEX: 36.15 KG/M2 | HEART RATE: 74 BPM | OXYGEN SATURATION: 98 % | WEIGHT: 217 LBS | RESPIRATION RATE: 18 BRPM | TEMPERATURE: 99 F | HEIGHT: 65 IN | SYSTOLIC BLOOD PRESSURE: 141 MMHG

## 2020-10-01 LAB
POCT GLUCOSE: 116 MG/DL (ref 70–110)
POCT GLUCOSE: 123 MG/DL (ref 70–110)

## 2020-10-01 PROCEDURE — 92526 ORAL FUNCTION THERAPY: CPT

## 2020-10-01 PROCEDURE — 97129 THER IVNTJ 1ST 15 MIN: CPT

## 2020-10-01 PROCEDURE — 97112 NEUROMUSCULAR REEDUCATION: CPT

## 2020-10-01 PROCEDURE — 97535 SELF CARE MNGMENT TRAINING: CPT

## 2020-10-01 PROCEDURE — 97530 THERAPEUTIC ACTIVITIES: CPT

## 2020-10-01 PROCEDURE — 99233 SBSQ HOSP IP/OBS HIGH 50: CPT | Mod: ,,, | Performed by: PSYCHIATRY & NEUROLOGY

## 2020-10-01 PROCEDURE — 99233 PR SUBSEQUENT HOSPITAL CARE,LEVL III: ICD-10-PCS | Mod: ,,, | Performed by: PSYCHIATRY & NEUROLOGY

## 2020-10-01 PROCEDURE — 25000003 PHARM REV CODE 250: Performed by: STUDENT IN AN ORGANIZED HEALTH CARE EDUCATION/TRAINING PROGRAM

## 2020-10-01 PROCEDURE — 25000003 PHARM REV CODE 250: Performed by: NURSE PRACTITIONER

## 2020-10-01 RX ORDER — ASPIRIN 81 MG/1
81 TABLET ORAL DAILY
Qty: 360 TABLET | Refills: 0 | Status: SHIPPED | OUTPATIENT
Start: 2020-10-02 | End: 2021-10-02

## 2020-10-01 RX ORDER — ATORVASTATIN CALCIUM 40 MG/1
40 TABLET, FILM COATED ORAL DAILY
Qty: 90 TABLET | Refills: 3 | Status: SHIPPED | OUTPATIENT
Start: 2020-10-02 | End: 2021-10-02

## 2020-10-01 RX ADMIN — ASPIRIN 81 MG: 81 TABLET, COATED ORAL at 09:10

## 2020-10-01 RX ADMIN — APIXABAN 5 MG: 5 TABLET, FILM COATED ORAL at 09:10

## 2020-10-01 RX ADMIN — LEVOTHYROXINE SODIUM 88 MCG: 88 TABLET ORAL at 06:10

## 2020-10-01 RX ADMIN — ONDANSETRON 8 MG: 8 TABLET, ORALLY DISINTEGRATING ORAL at 10:10

## 2020-10-01 RX ADMIN — METOPROLOL TARTRATE 12.5 MG: 25 TABLET, FILM COATED ORAL at 09:10

## 2020-10-01 RX ADMIN — PANTOPRAZOLE SODIUM 40 MG: 40 TABLET, DELAYED RELEASE ORAL at 09:10

## 2020-10-01 RX ADMIN — ACETAMINOPHEN 650 MG: 325 TABLET ORAL at 09:10

## 2020-10-01 RX ADMIN — ATORVASTATIN CALCIUM 40 MG: 20 TABLET, FILM COATED ORAL at 09:10

## 2020-10-01 NOTE — PLAN OF CARE
POC reviewed with pt. Pt verbalized understanding. Questions and concerns addressed. No acute events overnight. Pt AAOx4, moves all extremities spontaneously, and denies any numbness or tingling. Pt on room air with no s/s of distress noted. Pt denies any complaints of pain at this time. Fall/safety precautions maintained. Bed locked and in lowest position with call light within reach. See flowsheets for full assessment and VS information.       Problem: Adjustment to Illness (Stroke, Ischemic/Transient Ischemic Attack)  Goal: Optimal Coping  Outcome: Ongoing, Progressing     Problem: Communication Impairment (Stroke, Ischemic/Transient Ischemic Attack)  Goal: Improved Communication Skills  Outcome: Ongoing, Progressing     Problem: Fall Injury Risk  Goal: Absence of Fall and Fall-Related Injury  Outcome: Ongoing, Progressing     Problem: Adult Inpatient Plan of Care  Goal: Plan of Care Review  Outcome: Ongoing, Progressing

## 2020-10-01 NOTE — ASSESSMENT & PLAN NOTE
One episode in the past of atrial fibrillation when patient was undergoing knee surgery  Placed on lopressor 12.5 mg BID   Started on apixiban 5 mg BID

## 2020-10-01 NOTE — PLAN OF CARE
Problem: SLP Goal  Goal: SLP Goal  Description: Speech Language Pathology Goals  Goals expected to be met by 10/5:  1. Pt will tolerate a mechanical soft diet and thin liquids without s/s of aspiration.   2. Pt will participate in ongoing swallowing assessment to determine if/when appropriate for further diet advancement.   3. Pt will perform OME's x 10 to increase oral motor strength and ROM.   4. Pt will participate in speech/language/cognitive evaluation. Goal met/initiated 9/28  ADDITIONAL GOALS:  4. Pt will recall 3/3 motor speech strategies.  5. Pt will utilize motor speech strategies during connected speech to improve speech intelligibility to 90% given min cues.   6. Pt will participate in evaluation of reading, writing, visual spatial, and functional math abilities. Writing and visual spatial abilities assessed 9/29. Left neglect evident. Waiting for reading glasses to complete reading addessment.           Outcome: Ongoing, Progressing  Pt discharging to rehab.  Continue SLP services to address dysarthria, oral motor weakness, dysarthria, cognitive-linguistic deficits, and left neglect.  Cont mechanical soft diet and thin liquids at this time.   ALONZO Corrigan, CCC-SLP  Speech Language Pathologist  [phone]  10/1/2020

## 2020-10-01 NOTE — PT/OT/SLP PROGRESS
"Occupational Therapy   Treatment    Name: Su Dent  MRN: [id number]  Admitting Diagnosis:  Embolic stroke involving right middle cerebral artery       Recommendations:     Discharge Recommendations: rehabilitation facility  Discharge Equipment Recommendations:  none  Barriers to discharge:  Other (Comment)(lives alone, assistance required)    Assessment:     Su Dent is a 64 y.o. female with a medical diagnosis of Embolic stroke involving right middle cerebral artery.  She presents with LSW, slightly delayed processing, and willingness to participate in today's session. She received nausea meds prior to OT session, which seemed to help with session endurance. Though still requiring cueing, OTS noted that she incorporated LUE more through WB and in (B) task. Performance deficits affecting safe and prior level of performance of ADLs and IADLs are weakness, impaired endurance, impaired self care skills, impaired functional mobilty, gait instability, impaired balance, impaired sensation, decreased coordination, decreased upper extremity function, decreased lower extremity function, decreased safety awareness, impaired fine motor, edema, visual deficits, impaired cognition. She would best benefit in collaborative IPR services in order to reach maximum potential of function.    Rehab Prognosis:  Good; patient would benefit from acute skilled OT services to address these deficits and reach maximum level of function until t/f to IPR.     Plan:     Patient to be seen 4 x/week to address the above listed problems via self-care/home management, therapeutic activities, therapeutic exercises, neuromuscular re-education, cognitive retraining, sensory integration  · Plan of Care Expires: 10/26/20  · Plan of Care Reviewed with: patient, friend    Subjective   "I feel weak, don't drop me."  Pt reported "I've been trying to use my L hand more."    Pain/Comfort:  · Pain Rating 1: 0/10    Objective:     Communicated with: " RN prior to session- pt received nausea meds prior to session.  Patient found supine with telemetry, SCD, peripheral IV upon OT entry to room.    General Precautions: Standard, fall, aspiration, other (see comments)(L inattention)   Orthopedic Precautions:N/A   Braces: N/A     Occupational Performance:     Bed Mobility:    · Patient completed Supine to Sit with minimum assistance and with side rail     Functional Mobility/Transfers:  · Patient completed Bed > Chair Transfer using Step (4 steps) Transfer technique with minimum assistance and tactile cueing for weight shift with hand-held assist    Activities of Daily Living:  · Grooming: maximal assistance with hand-over-hand (A) for LUE to assist RUE in putting hair into pony tail; (I) c RUE to brush hair      [facility] 6 Click ADL: 14    Treatment & Education:  -pt oriented to self, place, and time  -pt re-edu on importance of attention to LUE, requiring verbal cues to attend to L hand throughout session  -retrograde massage performed on LUE, followed by pt edu on proper self-massage technique  -pt observed to have slight delay in processing of questions asked by MD  -male friend edu on attending to LUE  -pt removed and changed 2/4 batteries in desk fan and only put in the other 2 batteries 2* fatigue, using RUE to change batteries and LUE to stabilize fan, requiring proximal stability of LUE  -pt completed hair brushing task requiring hand over hand assist to include LUE      Patient left up in chair with call button in reach, friend present and desk fan onEducation:      GOALS:   Multidisciplinary Problems     Occupational Therapy Goals        Problem: Occupational Therapy Goal    Goal Priority Disciplines Outcome Interventions   Occupational Therapy Goal     OT, PT/OT Ongoing, Progressing    Description: Goals to be met by: 10/4     Patient will increase functional independence with ADLs by performing:    UE Dressing while seated EOB with Minimal Assistance.    *limited by nausea  LE Dressing (pants, brief) with Minimal Assistance.  *limited by nausea  Grooming while standing at sink with Minimal Assistance.*limited by weakness, anxiousness/fear  Toileting from toilet with Minimal Assistance for hygiene and clothing management.   *revised: BSC c Mod(A)  Toilet transfer to toilet with Minimal Assistance. Partially met - to BSC  Functional mobility at household distance with min(A). Partially met - bedroom distance                     Time Tracking:     OT Date of Treatment: 10/01/20  OT Start Time: 1017  OT Stop Time: 1101  OT Total Time (min): 44 min    Billable Minutes:Self Care/Home Management 8  Therapeutic Activity 15  Neuromuscular Re-education 12    YUDITH Vanegas  10/1/2020

## 2020-10-01 NOTE — PLAN OF CARE
[facility]    FACILITY TRANSFER ORDERS      Patient Name: Su Dent  YOB: 1955    PCP: Aris Youngblood MD   PCP Address: [address] [facility] / [place] [address]  PCP Phone Number: [phone]  PCP Fax: [phone]    Encounter Date: 10/01/2020    Admit to: IP rehab     Vital Signs:  Routine    Diagnoses:   Active Hospital Problems    Diagnosis  POA    *Embolic stroke involving right middle cerebral artery [I63.411]  Yes    DM II (diabetes mellitus, type II), controlled [E11.9]  Yes    Mixed hyperlipidemia [E78.2]  Yes    Cytotoxic cerebral edema [G93.6]  Yes    Atrial fibrillation [I48.91]  Unknown      Resolved Hospital Problems   No resolved problems to display.       Allergies:  Review of patient's allergies indicates:   Allergen Reactions    Adhesive     Codeine        Diet: Mechanical soft, Liquid Diet Level: Thin     Activities: Activity as tolerated    Nursing: None     Labs: CBC and CMP Daily for 30 days     CONSULTS:    Physical Therapy to evaluate and treat. , Occupational Therapy to evaluate and treat. and Speech Therapy to evaluate and treat for Language, Swallowing and Cognition.    MISCELLANEOUS CARE:  N/A    WOUND CARE ORDERS  None    Medications: Review discharge medications with patient and family and provide education.      Current Discharge Medication List      START taking these medications    Details   apixaban (ELIQUIS) 5 mg Tab Take 1 tablet (5 mg total) by mouth 2 (two) times daily.  Qty: 60 tablet, Refills: 11      aspirin (ECOTRIN) 81 MG EC tablet Take 1 tablet (81 mg total) by mouth once daily.  Qty: 360 tablet, Refills: 0      atorvastatin (LIPITOR) 40 MG tablet Take 1 tablet (40 mg total) by mouth once daily.  Qty: 90 tablet, Refills: 3         CONTINUE these medications which have NOT CHANGED    Details   acetaminophen (TYLENOL) 325 MG tablet Take 325 mg by mouth every 6 (six) hours as needed for Pain.      azelastine (ASTELIN)  137 mcg (0.1 %) nasal spray 2 sprays by Nasal route 2 (two) times daily.      cetirizine (ZERVIATE) 0.24 % Dpet Apply 1 drop to eye 2 (two) times daily.      dapagliflozin (FARXIGA) 10 mg tablet Take 10 mg by mouth once daily.      insulin glargine,hum.rec.anlog (LANTUS SOLOSTAR U-100 INSULIN SUBQ) Inject 35 Units into the skin every evening.      metoprolol tartrate (LOPRESSOR) 12.5 mg tablet Take 12.5 mg by mouth 2 (two) times daily.      pantoprazole (PROTONIX) 40 MG tablet Take 40 mg by mouth once daily.      SITagliptin (JANUVIA) 100 MG Tab Take 100 mg by mouth once daily.      SYNTHROID 88 mcg tablet Take 88 mcg by mouth before breakfast.                  _________________________________  Abhijit Jaquez MD  10/01/2020

## 2020-10-01 NOTE — PLAN OF CARE
Goals reviewed and revised.    Problem: Occupational Therapy Goal  Goal: Occupational Therapy Goal  Description: Goals to be met by: 10/4     Patient will increase functional independence with ADLs by performing:    UE Dressing while seated EOB with Minimal Assistance.   *limited by nausea  LE Dressing (pants, brief) with Minimal Assistance.  *limited by nausea  Grooming while standing at sink with Minimal Assistance.  *limited by weakness, anxiousness/fear  Toileting from toilet with Minimal Assistance for hygiene and clothing management.   *revised: BSC c Mod(A)  Toilet transfer to toilet with Minimal Assistance. Partially met - to BSC  Functional mobility at household distance with min(A). Partially met - bedroom distance    Outcome: Ongoing, Progressing     Zeinab Stark 10/1/2020

## 2020-10-01 NOTE — PROGRESS NOTES
[facility]  Vascular Neurology  Comprehensive Stroke Center  Progress Note    Assessment/Plan:     * Embolic stroke involving right middle cerebral artery  Su Dent is a 64 y.o. female with significant medical history of HLD, DM II presented to hospital as a transfer from [facility] for evaluation of R MCA stroke.  tPA not administered due to the patient presenting outside of the treatment window.  Imaging at the OSH revealed findings concerning for hyperdense R MCA sign.  On arrival to this facility the patient had dysarthria and right gaze preference.  She was also noted to have a right facial droop and mild right sided drift.  No endovascular intervention at this time.    Etiology: Cardioembolic versus atheroembolic     TTE showing 70% EF (Grade II diastolic dysfunction) with severe left atrial enlargement. TCD bubble study showing emboli from the right and left. Due to this she will be on asa and apixiban 5 mg BID. Her nephew is a cardiologist, and he will arrange for a 30 day event monitoring at [place].    Antithrombotics for secondary stroke prevention: Antiplatelets: Aspirin: 81 mg daily and apixiban 5 mg BID   Statins for secondary stroke prevention and hyperlipidemia, if present:   Statins: Atorvastatin- 80 mg daily    Aggressive risk factor modification: DM, HLD, thyroid disease     Rehab efforts: The patient has been evaluated by a stroke team provider and the therapy needs have been fully considered based off the presenting complaints and exam findings. The following therapy evaluations are needed: PT evaluate and treat, OT evaluate and treat, SLP evaluate and treat    Diagnostics ordered/pending: None      VTE prophylaxis: Already anticoagulated     BP parameters: Infarct: No intervention, SBP <220        Cytotoxic cerebral edema  -Small area of cytotoxic cerebral edema identified when reviewing brain imaging in the territory of the R middle cerebral artery. There is  no mass effect associated with it. We will continue to monitor the patients clinical exam for any worsening of symptoms which may indicate expansion of the stroke or the area of the edema resulting in the clinical change. The pattern is suggestive of cardioembolic etiology.        Mixed hyperlipidemia  -Stroke risk factor. LDL 90.6   -Atorvastatin 40 mg daily.    Atrial fibrillation  One episode in the past of atrial fibrillation when patient was undergoing knee surgery  Placed on lopressor 12.5 mg BID   Started on apixiban 5 mg BID    DM II (diabetes mellitus, type II), controlled  -Stroke risk factor.    -Goal glucose 140-180 for now.  -Hold home meds for now.  -Moderate correction SSI and QAC/HS accuchecks, titrate prn         09/28/2020 TTE showing 70% EF (Grade II diastolic dysfunction) with severe left atrial enlargement. MRI w/o contrast showing multiple areas of diffusion signal abnormality involving the right cerebral hemisphere  09/29/2020 Will obtain TCD bubble study to differentiate if emboli is coming from carotids or if it due to atrial fibrillation. Lopressor 12.5 mg BID started yesterday (home medications). Patient also would like to go to rehab near [place].   09/30/2020 TCD showing emboli count in the R:20 and emboli count in the left 109. Will start patient on apixiban given her severe atrial enlargement. Otherwise neurologic exam is unchanged.   10/01/2020 Started patient on apixiban 5 mg BID. Nephew is cardiologist and he will arrange for a 30 day cardiac monitoring after rehab. Pending acceptance to rehab in [place].     STROKE DOCUMENTATION   Acute Stroke Times   Symptom Onset Date: 09/26/20  Symptom Onset Time: 2215    NIH Scale:  1a. Level of Consciousness: 0-->Alert, keenly responsive  1b. LOC Questions: 0-->Answers both questions correctly  1c. LOC Commands: 0-->Performs both tasks correctly  2. Best Gaze: 0-->Normal  3. Visual: 0-->No visual loss  4. Facial Palsy: 1-->Minor  paralysis (flattened nasolabial fold, asymmetry on smiling)  5a. Motor Arm, Left: 0-->No drift, limb holds 90 (or 45) degrees for full 10 secs  5b. Motor Arm, Right: 0-->No drift, limb holds 90 (or 45) degrees for full 10 secs  6a. Motor Leg, Left: 0-->No drift, leg holds 30 degree position for full 5 secs  6b. Motor Leg, Right: 0-->No drift, leg holds 30 degree position for full 5 secs  7. Limb Ataxia: 0-->Absent  8. Sensory: 0-->Normal, no sensory loss  9. Best Language: 1-->Mild-to-moderate aphasia, some obvious loss of fluency or facility of comprehension, without significant limitation on ideas expressed or form of expression. Reduction of speech and/or comprehension, however, makes conversation. . . (see row details)  10. Dysarthria: 1-->Mild-to-moderate dysarthria, patient slurs at least some words and, at worst, can be understood with some difficulty  11. Extinction and Inattention (formerly Neglect): 0-->No abnormality  Total (NIH Stroke Scale): 3       Modified Ruthton Score: 1  Ambrose Coma Scale:15   ABCD2 Score:    [id number] Score:   HAS -BLED Score:   ICH Score:   Hunt & Newell Classification:      Hemorrhagic change of an Ischemic Stroke: Does this patient have an ischemic stroke with hemorrhagic changes? No     Neurologic Chief Complaint: Right MCA stroke ( dysarthia, right gaze preference and a right facial droop)    Subjective:     Interval History: Patient is seen for follow-up neurological assessment and treatment recommendations:     Started patient on apixiban 5 mg BID. Nephew is cardiologist and he will arrange for a 30 day cardiac monitoring after rehab. Accepted into rehab at [facility], Past Medical, Family, and Social History remains the same as documented in the initial encounter.     Review of Systems   Constitutional: Negative for activity change, fatigue and fever.   HENT: Negative for trouble swallowing and voice change.    Eyes: Negative for photophobia and visual  disturbance.   Respiratory: Negative for cough and shortness of breath.    Cardiovascular: Negative for chest pain and palpitations.   Gastrointestinal: Negative for nausea and vomiting.   Genitourinary: Negative for difficulty urinating and flank pain.   Musculoskeletal: Positive for gait problem. Negative for arthralgias.   Skin: Negative for color change and rash.   Neurological: Positive for facial asymmetry, speech difficulty and weakness.   Psychiatric/Behavioral: Negative for agitation and confusion.     Scheduled Meds:   apixaban  5 mg Oral BID    aspirin  81 mg Oral Daily    atorvastatin  40 mg Oral Daily    levothyroxine  88 mcg Oral Before breakfast    metoprolol tartrate  12.5 mg Oral BID    pantoprazole  40 mg Oral Daily    psyllium husk (aspartame)  1 packet Oral Daily     Continuous Infusions:   sodium chloride 0.9%       PRN Meds:acetaminophen, bisacodyL, dextrose 50%, dextrose 50%, glucagon (human recombinant), glucose, glucose, insulin aspart U-100, labetaloL, ondansetron, ondansetron, sodium chloride 0.9%, sodium chloride 0.9%    Objective:     Vital Signs (Most Recent):  Temp: 98.6 °F (37 °C) (10/01/20 1131)  Pulse: 74 (10/01/20 1118)  Resp: 18 (10/01/20 0325)  BP: (!) 141/58 (10/01/20 1131)  SpO2: 98 % (10/01/20 0821)  BP Location: Left arm    Vital Signs Range (Last 24H):  Temp:  [96.8 °F (36 °C)-98.6 °F (37 °C)]   Pulse:  [53-78]   Resp:  [15-18]   BP: (124-141)/(56-77)   SpO2:  [95 %-100 %]   BP Location: Left arm    Physical Exam  Vitals signs and nursing note reviewed.   Constitutional:       General: She is not in acute distress.     Appearance: She is well-developed. She is obese. She is ill-appearing. She is not diaphoretic.   HENT:      Head: Normocephalic and atraumatic.      Right Ear: External ear normal.      Left Ear: External ear normal.      Nose: Nose normal.      Mouth/Throat:      Mouth: Mucous membranes are dry.   Eyes:      General: No scleral icterus.        Right  eye: No discharge.         Left eye: No discharge.      Conjunctiva/sclera: Conjunctivae normal.      Pupils: Pupils are equal, round, and reactive to light.   Neck:      Musculoskeletal: Normal range of motion and neck supple.   Cardiovascular:      Rate and Rhythm: Normal rate and regular rhythm.   Pulmonary:      Effort: Pulmonary effort is normal. No respiratory distress.      Breath sounds: Normal breath sounds.   Abdominal:      General: Bowel sounds are decreased. There is no distension.      Palpations: Abdomen is soft.      Tenderness: There is no abdominal tenderness.   Musculoskeletal:      Right lower leg: No edema.      Left lower leg: No edema.   Skin:     General: Skin is warm and dry.      Capillary Refill: Capillary refill takes less than 2 seconds.   Neurological:      Mental Status: She is alert and oriented to person, place, and time.      Motor: Weakness present.         Neurological Exam:   LOC: alert  Attention Span: Good   Language: No aphasia  Articulation: No dysarthria  Orientation: Person, Place, Time   Visual Fields: Full  EOM (CN III, IV, VI): Full/intact  Pupils (CN II, III): PERRL  Facial Sensation (CN V): Normal  Facial Movement (CN VII): Lower facial weakness on the Left  Gag Reflex: present  Reflexes: 2+ throughout  Motor: Arm left  Paresis: 3/5  Leg left  Paresis: 4/5  Arm right  Paresis: 4/5  Leg right Paresis: 4/5  Cebellar: Axia/Trunk Instability/Ataxia   Sensation: Intact to light touch, temperature and vibration  Tone: Normal tone throughout    Laboratory:  CMP:   No results for input(s): GLUCOSE, CALCIUM, ALBUMIN, PROT, NA, K, CO2, CL, BUN, CREATININE, ALKPHOS, ALT, AST, BILITOT in the last 24 hours.  BMP:   Recent Labs   Lab 09/29/20  0423      K 3.6      CO2 23   BUN 12   CREATININE 0.6   CALCIUM 8.6*     CBC:   Recent Labs   Lab 09/29/20 0423   WBC 7.67   RBC 4.92   HGB 12.6   HCT 41.2      MCV 84   MCH 25.6*   MCHC 30.6*     Lipid Panel:   Recent Labs    Lab 09/27/20  0418   CHOL 155   LDLCALC 96.2   HDL 45   TRIG 69     Coagulation:   Recent Labs   Lab 09/28/20  0353   INR 1.0   APTT 25.5     Platelet Aggregation Study: No results for input(s): PLTAGG, PLTAGINTERP, PLTAGREGLACO, ADPPLTAGGREG in the last 168 hours.  Hgb A1C:   Recent Labs   Lab 09/27/20 0418   HGBA1C 6.5*     TSH:   Recent Labs   Lab 09/27/20 0418   TSH 0.947       Diagnostic Results     Brain imaging:  CTH 9/26/2020 (obtained at OSH) Findings suggestive of acute infarct in the right insular region, junction of the right temporal, frontal and parietal lobes and further within the right parietal lobe.  No significant mass effect or midline shift.  No acute intracranial hemorrhage.     CTH 9/27/2020 Hypodensity noted in the right MCA territory     MRI Brain      Multiple areas of diffusion signal abnormality involving the right cerebral hemisphere, consistent with acute ischemia/infarct as discussed above.    Vessel Imaging:  CTA Head and Neck 9/27/2020 (obtained at OSH) extensive thrombus throughout the right cervical internal carotid artery with trace peripheral flow.  Left internal carotid artery is unremarkable.  Complete occlusion of the majority of the right ICA with mild reconstitution at the carotid terminus.  Complete occlusion of the inferior M2 segment of the right MCA.     Cardiac Evaluation:   TTE   70% EF (Grade II diastolic dysfunction) with severe left atrial enlargement.      Abhijit Jaquez MD  Comprehensive Stroke Center  Department of Vascular Neurology   [facility]

## 2020-10-01 NOTE — ASSESSMENT & PLAN NOTE
Su Dent is a 64 y.o. female with significant medical history of HLD, DM II presented to hospital as a transfer from [facility] for evaluation of R MCA stroke.  tPA not administered due to the patient presenting outside of the treatment window.  Imaging at the OSH revealed findings concerning for hyperdense R MCA sign.  On arrival to this facility the patient had dysarthria and right gaze preference.  She was also noted to have a right facial droop and mild right sided drift.  No endovascular intervention at this time.    Etiology: Cardioembolic versus atheroembolic     TTE showing 70% EF (Grade II diastolic dysfunction) with severe left atrial enlargement. TCD bubble study showing emboli from the right and left. Due to this she will be on asa and apixiban 5 mg BID. Her nephew is a cardiologist, and he will arrange for a 30 day event monitoring at [place].    Antithrombotics for secondary stroke prevention: Antiplatelets: Aspirin: 81 mg daily and apixiban 5 mg BID   Statins for secondary stroke prevention and hyperlipidemia, if present:   Statins: Atorvastatin- 80 mg daily    Aggressive risk factor modification: DM, HLD, thyroid disease     Rehab efforts: The patient has been evaluated by a stroke team provider and the therapy needs have been fully considered based off the presenting complaints and exam findings. The following therapy evaluations are needed: PT evaluate and treat, OT evaluate and treat, SLP evaluate and treat    Diagnostics ordered/pending: None      VTE prophylaxis: Already anticoagulated     BP parameters: Infarct: No intervention, SBP <220

## 2020-10-01 NOTE — PLAN OF CARE
10/01/20 1147   Post-Acute Status   Post-Acute Authorization Placement   Post-Acute Placement Status Set-up Complete       Pt has been accepted by [facility].  Nurse can call report to Darline [phone]. Transport setup with EMS for 1:30pm.  SW in contact with CM and Medical staff. Will continue to follow and offer support as needed.     Cirilo Campos LMSW  [facility]   Ext. [phone]

## 2020-10-01 NOTE — DISCHARGE SUMMARY
"[facility]  Vascular Neurology  Comprehensive Stroke Center  Discharge Summary     Summary:     Admit Date: 9/27/2020  4:36 AM    Discharge Date and Time:  10/01/2020 1:30 PM    Attending Physician: Arnoldo Kaye MD     Discharge Provider: Abhijit Jaquez MD    History of Present Illness: Su Dent is a 64 y.o. female with significant medical history of HLD, DM II presented to hospital as a transfer from [facility] for evaluation of R MCA stroke.  Patient was LKN around 1500 on 09/26/2020.  She acutely developed dysarthria and dysphagia w/left-sided weakness.  The patient states she had chills on Friday night and had a few episodes of N/V and "loose stools" on Saturday prior to symptom onset.  Nothing exacerbated or alleviated her symptoms.  She presented to the [facility] ED where a CTH was obtained and revealed findings concerning for hyperdense R MCA.  Telestroke consult was performed by Dr. Maguire.  tPA not administered due to the patient presenting outside of the treatment window.  The patient was transferred to [facility] for higher level of care, possible endovascular intervention.  On arrival to this facility the patient is AA&O x 4.  She is c/o nausea, but denies HA, dizziness, change in vision, CP, or SOB.  On exam the patient was noted to have a right gaze preference and dysarthria as well as a right facial droop and mild right UE/LE drift.  She was taken to CT for CTH which was reviewed by Dr. Dickson.  CT revealed infarcted area in the right MCA territory.  Patient not a candidate for IR intervention at this time.  Will be admitted to Vascular Neurology.      Hospital Course (synopsis of major diagnoses, care, treatment, and services provided during the course of the hospital stay): TTE showing 70% EF (Grade II diastolic dysfunction) with severe left atrial enlargement. MRI w/o contrast showing multiple areas of diffusion signal abnormality involving the " right cerebral hemisphere. TCD showing emboli count in the R:20 and emboli count in the left 109. Will start patient on apixiban given her severe atrial enlargement. Nephew is cardiologist and he will arrange for a 30 day cardiac monitoring after rehab. Patient accepted to rehab in [place].     Stroke Etiology: Evident Atrial fibrillation Cardio-Aortic Embolism (CE)    STROKE DOCUMENTATION   Acute Stroke Times   Symptom Onset Date: 09/26/20  Symptom Onset Time: 2215     NIH Scale:  1a. Level of Consciousness: 0-->Alert, keenly responsive  1b. LOC Questions: 0-->Answers both questions correctly  1c. LOC Commands: 0-->Performs both tasks correctly  2. Best Gaze: 0-->Normal  3. Visual: 0-->No visual loss  4. Facial Palsy: 1-->Minor paralysis (flattened nasolabial fold, asymmetry on smiling)  5a. Motor Arm, Left: 0-->No drift, limb holds 90 (or 45) degrees for full 10 secs  5b. Motor Arm, Right: 0-->No drift, limb holds 90 (or 45) degrees for full 10 secs  6a. Motor Leg, Left: 0-->No drift, leg holds 30 degree position for full 5 secs  6b. Motor Leg, Right: 0-->No drift, leg holds 30 degree position for full 5 secs  7. Limb Ataxia: 0-->Absent  8. Sensory: 0-->Normal, no sensory loss  9. Best Language: 1-->Mild-to-moderate aphasia, some obvious loss of fluency or facility of comprehension, without significant limitation on ideas expressed or form of expression. Reduction of speech and/or comprehension, however, makes conversation. . . (see row details)  10. Dysarthria: 1-->Mild-to-moderate dysarthria, patient slurs at least some words and, at worst, can be understood with some difficulty  11. Extinction and Inattention (formerly Neglect): 0-->No abnormality  Total (NIH Stroke Scale): 3        Modified Sp Score: 1  Ambrose Coma Scale:15   ABCD2 Score:    [id number] Score:   HAS -BLED Score:   ICH Score:   Hunt & Newell Classification:       Assessment/Plan:     Diagnostic Results:  Brain  imaging:  CTH 9/26/2020 (obtained at OSH) Findings suggestive of acute infarct in the right insular region, junction of the right temporal, frontal and parietal lobes and further within the right parietal lobe.  No significant mass effect or midline shift.  No acute intracranial hemorrhage.     CTH 9/27/2020 Hypodensity noted in the right MCA territory     MRI Brain      Multiple areas of diffusion signal abnormality involving the right cerebral hemisphere, consistent with acute ischemia/infarct as discussed above.     Vessel Imaging:  CTA Head and Neck 9/27/2020 (obtained at OSH) extensive thrombus throughout the right cervical internal carotid artery with trace peripheral flow.  Left internal carotid artery is unremarkable.  Complete occlusion of the majority of the right ICA with mild reconstitution at the carotid terminus.  Complete occlusion of the inferior M2 segment of the right MCA.     Cardiac Evaluation:   TTE   70% EF (Grade II diastolic dysfunction) with severe left atrial enlargement.          Disposition: Rehab Facility    Final Active Diagnoses:    Diagnosis Date Noted POA    PRINCIPAL PROBLEM:  Embolic stroke involving right middle cerebral artery [I63.411] 09/27/2020 Yes    DM II (diabetes mellitus, type II), controlled [E11.9] 09/27/2020 Yes    Mixed hyperlipidemia [E78.2] 09/27/2020 Yes    Cytotoxic cerebral edema [G93.6] 09/27/2020 Yes    Atrial fibrillation [I48.91] 09/27/2020 Unknown      Problems Resolved During this Admission:     * Embolic stroke involving right middle cerebral artery  Su Dent is a 64 y.o. female with significant medical history of HLD, DM II presented to hospital as a transfer from [facility] for evaluation of R MCA stroke.  tPA not administered due to the patient presenting outside of the treatment window.  Imaging at the OSH revealed findings concerning for hyperdense R MCA sign.  On arrival to this facility the patient had dysarthria and right  gaze preference.  She was also noted to have a right facial droop and mild right sided drift.  No endovascular intervention at this time.    Etiology: Cardioembolic versus atheroembolic     TTE showing 70% EF (Grade II diastolic dysfunction) with severe left atrial enlargement. TCD bubble study showing emboli from the right and left. Due to this she will be on asa and apixiban 5 mg BID. Her nephew is a cardiologist, and he will arrange for a 30 day event monitoring at [place].    Antithrombotics for secondary stroke prevention: Antiplatelets: Aspirin: 81 mg daily and apixiban 5 mg BID   Statins for secondary stroke prevention and hyperlipidemia, if present:   Statins: Atorvastatin- 80 mg daily    Aggressive risk factor modification: DM, HLD, thyroid disease     Rehab efforts: The patient has been evaluated by a stroke team provider and the therapy needs have been fully considered based off the presenting complaints and exam findings. The following therapy evaluations are needed: PT evaluate and treat, OT evaluate and treat, SLP evaluate and treat    Diagnostics ordered/pending: None      VTE prophylaxis: Already anticoagulated     BP parameters: Infarct: No intervention, SBP <220        Cytotoxic cerebral edema  -Small area of cytotoxic cerebral edema identified when reviewing brain imaging in the territory of the R middle cerebral artery. There is no mass effect associated with it. We will continue to monitor the patients clinical exam for any worsening of symptoms which may indicate expansion of the stroke or the area of the edema resulting in the clinical change. The pattern is suggestive of cardioembolic etiology.        Mixed hyperlipidemia  -Stroke risk factor. LDL 90.6   -Atorvastatin 40 mg daily.    Atrial fibrillation  One episode in the past of atrial fibrillation when patient was undergoing knee surgery  Placed on lopressor 12.5 mg BID   Started on apixiban 5 mg BID    DM II (diabetes mellitus, type II),  controlled  -Stroke risk factor.    -Goal glucose 140-180 for now.  -Hold home meds for now.  -Moderate correction SSI and QAC/HS accuchecks, titrate prn        Recommendations:     Post-discharge complication risks: None    Stroke Education given to: patient and family    Follow-up in Stroke Clinic in 30 days.     Discharge Plan:  Anticoagulant: Dabigatran    Follow Up:  Follow-up Information     Aris Youngblood MD.    Specialty: Internal Medicine  Why: Please call to schedule your hospital follow-up appointment for within 1 week of discharge from inpatient rehab facility.  Contact information:  [address]  [place] [address]  [phone]                   Patient Instructions:   No discharge procedures on file.    Medications:  Reconciled Home Medications:      Medication List      START taking these medications    apixaban 5 mg Tab  Commonly known as: ELIQUIS  Take 1 tablet (5 mg total) by mouth 2 (two) times daily.     aspirin 81 MG EC tablet  Commonly known as: ECOTRIN  Take 1 tablet (81 mg total) by mouth once daily.  Start taking on: October 2, 2020     atorvastatin 40 MG tablet  Commonly known as: LIPITOR  Take 1 tablet (40 mg total) by mouth once daily.  Start taking on: October 2, 2020        CONTINUE taking these medications    acetaminophen 325 MG tablet  Commonly known as: TYLENOL  Take 325 mg by mouth every 6 (six) hours as needed for Pain.     azelastine 137 mcg (0.1 %) nasal spray  Commonly known as: ASTELIN  2 sprays by Nasal route 2 (two) times daily.     FARXIGA 10 mg tablet  Generic drug: dapagliflozin  Take 10 mg by mouth once daily.     LANTUS SOLOSTAR U-100 INSULIN SUBQ  Inject 35 Units into the skin every evening.     metoprolol tartrate 12.5 mg tablet  Commonly known as: LOPRESSOR  Take 12.5 mg by mouth 2 (two) times daily.     pantoprazole 40 MG tablet  Commonly known as: PROTONIX  Take 40 mg by mouth once daily.     SITagliptin 100 MG Tab  Commonly known as:  JANUVIA  Take 100 mg by mouth once daily.     SYNTHROID 88 MCG tablet  Generic drug: levothyroxine  Take 88 mcg by mouth before breakfast.     ZERVIATE 0.24 % Dpet  Generic drug: cetirizine  Apply 1 drop to eye 2 (two) times daily.            Abhijit Jaquez MD  [facility]  Department of Vascular Neurology   [facility]

## 2020-10-01 NOTE — SUBJECTIVE & OBJECTIVE
Neurologic Chief Complaint: Right MCA stroke ( dysarthia, right gaze preference and a right facial droop)    Subjective:     Interval History: Patient is seen for follow-up neurological assessment and treatment recommendations:     Started patient on apixiban 5 mg BID. Nephew is cardiologist and he will arrange for a 30 day cardiac monitoring after rehab. Accepted into rehab at [facility], Past Medical, Family, and Social History remains the same as documented in the initial encounter.     Review of Systems   Constitutional: Negative for activity change, fatigue and fever.   HENT: Negative for trouble swallowing and voice change.    Eyes: Negative for photophobia and visual disturbance.   Respiratory: Negative for cough and shortness of breath.    Cardiovascular: Negative for chest pain and palpitations.   Gastrointestinal: Negative for nausea and vomiting.   Genitourinary: Negative for difficulty urinating and flank pain.   Musculoskeletal: Positive for gait problem. Negative for arthralgias.   Skin: Negative for color change and rash.   Neurological: Positive for facial asymmetry, speech difficulty and weakness.   Psychiatric/Behavioral: Negative for agitation and confusion.     Scheduled Meds:   apixaban  5 mg Oral BID    aspirin  81 mg Oral Daily    atorvastatin  40 mg Oral Daily    levothyroxine  88 mcg Oral Before breakfast    metoprolol tartrate  12.5 mg Oral BID    pantoprazole  40 mg Oral Daily    psyllium husk (aspartame)  1 packet Oral Daily     Continuous Infusions:   sodium chloride 0.9%       PRN Meds:acetaminophen, bisacodyL, dextrose 50%, dextrose 50%, glucagon (human recombinant), glucose, glucose, insulin aspart U-100, labetaloL, ondansetron, ondansetron, sodium chloride 0.9%, sodium chloride 0.9%    Objective:     Vital Signs (Most Recent):  Temp: 98.6 °F (37 °C) (10/01/20 1131)  Pulse: 74 (10/01/20 1118)  Resp: 18 (10/01/20 0325)  BP: (!) 141/58 (10/01/20 1131)  SpO2: 98 %  (10/01/20 0821)  BP Location: Left arm    Vital Signs Range (Last 24H):  Temp:  [96.8 °F (36 °C)-98.6 °F (37 °C)]   Pulse:  [53-78]   Resp:  [15-18]   BP: (124-141)/(56-77)   SpO2:  [95 %-100 %]   BP Location: Left arm    Physical Exam  Vitals signs and nursing note reviewed.   Constitutional:       General: She is not in acute distress.     Appearance: She is well-developed. She is obese. She is ill-appearing. She is not diaphoretic.   HENT:      Head: Normocephalic and atraumatic.      Right Ear: External ear normal.      Left Ear: External ear normal.      Nose: Nose normal.      Mouth/Throat:      Mouth: Mucous membranes are dry.   Eyes:      General: No scleral icterus.        Right eye: No discharge.         Left eye: No discharge.      Conjunctiva/sclera: Conjunctivae normal.      Pupils: Pupils are equal, round, and reactive to light.   Neck:      Musculoskeletal: Normal range of motion and neck supple.   Cardiovascular:      Rate and Rhythm: Normal rate and regular rhythm.   Pulmonary:      Effort: Pulmonary effort is normal. No respiratory distress.      Breath sounds: Normal breath sounds.   Abdominal:      General: Bowel sounds are decreased. There is no distension.      Palpations: Abdomen is soft.      Tenderness: There is no abdominal tenderness.   Musculoskeletal:      Right lower leg: No edema.      Left lower leg: No edema.   Skin:     General: Skin is warm and dry.      Capillary Refill: Capillary refill takes less than 2 seconds.   Neurological:      Mental Status: She is alert and oriented to person, place, and time.      Motor: Weakness present.         Neurological Exam:   LOC: alert  Attention Span: Good   Language: No aphasia  Articulation: No dysarthria  Orientation: Person, Place, Time   Visual Fields: Full  EOM (CN III, IV, VI): Full/intact  Pupils (CN II, III): PERRL  Facial Sensation (CN V): Normal  Facial Movement (CN VII): Lower facial weakness on the Left  Gag Reflex:  present  Reflexes: 2+ throughout  Motor: Arm left  Paresis: 3/5  Leg left  Paresis: 4/5  Arm right  Paresis: 4/5  Leg right Paresis: 4/5  Cebellar: Axia/Trunk Instability/Ataxia   Sensation: Intact to light touch, temperature and vibration  Tone: Normal tone throughout    Laboratory:  CMP:   No results for input(s): GLUCOSE, CALCIUM, ALBUMIN, PROT, NA, K, CO2, CL, BUN, CREATININE, ALKPHOS, ALT, AST, BILITOT in the last 24 hours.  BMP:   Recent Labs   Lab 09/29/20  0423      K 3.6      CO2 23   BUN 12   CREATININE 0.6   CALCIUM 8.6*     CBC:   Recent Labs   Lab 09/29/20 0423   WBC 7.67   RBC 4.92   HGB 12.6   HCT 41.2      MCV 84   MCH 25.6*   MCHC 30.6*     Lipid Panel:   Recent Labs   Lab 09/27/20 0418   CHOL 155   LDLCALC 96.2   HDL 45   TRIG 69     Coagulation:   Recent Labs   Lab 09/28/20  0353   INR 1.0   APTT 25.5     Platelet Aggregation Study: No results for input(s): PLTAGG, PLTAGINTERP, PLTAGREGLACO, ADPPLTAGGREG in the last 168 hours.  Hgb A1C:   Recent Labs   Lab 09/27/20 0418   HGBA1C 6.5*     TSH:   Recent Labs   Lab 09/27/20 0418   TSH 0.947       Diagnostic Results     Brain imaging:  CTH 9/26/2020 (obtained at OSH) Findings suggestive of acute infarct in the right insular region, junction of the right temporal, frontal and parietal lobes and further within the right parietal lobe.  No significant mass effect or midline shift.  No acute intracranial hemorrhage.     CTH 9/27/2020 Hypodensity noted in the right MCA territory     MRI Brain      Multiple areas of diffusion signal abnormality involving the right cerebral hemisphere, consistent with acute ischemia/infarct as discussed above.    Vessel Imaging:  CTA Head and Neck 9/27/2020 (obtained at OSH) extensive thrombus throughout the right cervical internal carotid artery with trace peripheral flow.  Left internal carotid artery is unremarkable.  Complete occlusion of the majority of the right ICA with mild reconstitution at  the carotid terminus.  Complete occlusion of the inferior M2 segment of the right MCA.     Cardiac Evaluation:   TTE   70% EF (Grade II diastolic dysfunction) with severe left atrial enlargement.

## 2020-10-01 NOTE — PT/OT/SLP PROGRESS
"Speech Language Pathology Treatment    Patient Name:  Su Dent   MRN:  [id number]  Admitting Diagnosis: Embolic stroke involving right middle cerebral artery    Recommendations:                 General Recommendations:  Dysphagia therapy, Speech/language therapy and Cognitive-linguistic therapy  Diet recommendations:  Mechanical soft, Liquid Diet Level: Thin   Aspiration Precautions: 1 bite/sip at a time, Alternating bites/sips, Avoid talking while eating, Check for pocketing/oral residue, HOB to 90 degrees, Meds whole 1 at a time, Monitor for s/s of aspiration, Small bites/sips and Strict aspiration precautions   General Precautions: Standard, aspiration, fall, vision impaired(left neglect)  Communication strategies:  encourage motor speech strategies (loud, slow, over-articulate)    Subjective     "Y'all have been wonderful.  I would recommend [facility]."    Pain/Comfort:  ·      Objective:     Has the patient been evaluated by SLP for swallowing?   Yes  Keep patient NPO? No   Current Respiratory Status: room air      Pt sitting up in recliner chair with friend present.  Pt appeared to have just finished lunch meal. Pt appeared to have consumed at least 75%.  No pocketing/oral residue noted in oral cavity. Pt performed OME's x 10 with improving strength and ROM.  Pt recalled 3/3 motor speech strategies IDN.  Pt read aloud math word problems with good intelligibility.  Visual guide and cue x 1 given for left attention when reading.  Pt solved math word problems with 90% accuracy IND/100% given cues.  Pt taking sips of water via bottle and straw x 5 t/o session.  Left corner leakage and anterior loss present at times with inconsistent awareness.  Cues to produce volitional swallow to increase management of pooled saliva.  Education provided to pt and friend regarding oral motor exercises, dysarthria, motor speech strategies, managing oral secretions, left attention, higher level cognition, and SLP treatment plan " and POC.   Pt demonstrated understanding of education provided, but will benefit from continued reinforcement.     Assessment:     Su Dent is a 64 y.o. female with an SLP diagnosis of Dysphagia, Dysarthria, Cognitive-Linguistic Impairment and Visio-Spatial Impairment.    Goals:   Multidisciplinary Problems     SLP Goals        Problem: SLP Goal    Goal Priority Disciplines Outcome   SLP Goal     SLP Ongoing, Progressing   Description: Speech Language Pathology Goals  Goals expected to be met by 10/5:  1. Pt will tolerate a mechanical soft diet and thin liquids without s/s of aspiration.   2. Pt will participate in ongoing swallowing assessment to determine if/when appropriate for further diet advancement.   3. Pt will perform OME's x 10 to increase oral motor strength and ROM.   4. Pt will participate in speech/language/cognitive evaluation. Goal met/initiated 9/28  ADDITIONAL GOALS:  4. Pt will recall 3/3 motor speech strategies.  5. Pt will utilize motor speech strategies during connected speech to improve speech intelligibility to 90% given min cues.   6. Pt will participate in evaluation of reading, writing, visual spatial, and functional math abilities. Writing and visual spatial abilities assessed 9/29. Left neglect evident. Waiting for reading glasses to complete reading addessment.                            Plan:     · Patient to be seen:  5 x/week   · Plan of Care expires:  10/27/20  · Plan of Care reviewed with:  patient, friend   · SLP Follow-Up:  Yes       Discharge recommendations:  rehabilitation facility     Time Tracking:     SLP Treatment Date:   10/01/20  Speech Start Time:  1156  Speech Stop Time:  1222     Speech Total Time (min):  26 min    Billable Minutes: Speech Therapy Individual (cognitive tx, 1 unit) 10, Treatment Swallowing Dysfunction 8 and Seld Care/Home Management Training 8    ALONZO Corrigan, CCC-SLP  10/01/2020     ALONZO Corrigan, CCC-SLP  Speech Language  Pathologist  [phone]  10/1/2020

## 2020-10-01 NOTE — ASSESSMENT & PLAN NOTE
Su Dent is a 64 y.o. female with significant medical history of HLD, DM II presented to hospital as a transfer from [facility] for evaluation of R MCA stroke.  tPA not administered due to the patient presenting outside of the treatment window.  Imaging at the OSH revealed findings concerning for hyperdense R MCA sign.  On arrival to this facility the patient had dysarthria and right gaze preference.  She was also noted to have a right facial droop and mild right sided drift.  No endovascular intervention at this time.    Etiology: Cardioembolic versus atheroembolic     TTE showing 70% EF (Grade II diastolic dysfunction) with severe left atrial enlargement. TCD bubble study showing emboli from the right and left. Due to this she will be on asa and apixiban 5 mg BID. Her nephew is a cardiologist, and he will arrange for a 30 day event monitoring at [place].    Antithrombotics for secondary stroke prevention: Antiplatelets: Aspirin: 81 mg daily and apixiban 5 mg BID   Statins for secondary stroke prevention and hyperlipidemia, if present:   Statins: Atorvastatin- 80 mg daily    Aggressive risk factor modification: DM, HLD, thyroid disease     Rehab efforts: The patient has been evaluated by a stroke team provider and the therapy needs have been fully considered based off the presenting complaints and exam findings. The following therapy evaluations are needed: PT evaluate and treat, OT evaluate and treat, SLP evaluate and treat    Diagnostics ordered/pending: None      VTE prophylaxis: Already anticoagulated     BP parameters: Infarct: No intervention, SBP <220

## 2020-10-05 NOTE — PT/OT/SLP DISCHARGE
Occupational Therapy Discharge Summary    Su Dent  MRN: [id number]   Principal Problem: Embolic stroke involving right middle cerebral artery      Patient Discharged from acute Occupational Therapy on 10/1/2020.  Please refer to prior OT note dated 10/1/2020 for functional status.    Assessment:      Goals partially met.    Objective:     GOALS:   Multidisciplinary Problems     Occupational Therapy Goals        Problem: Occupational Therapy Goal    Goal Priority Disciplines Outcome Interventions   Occupational Therapy Goal     OT, PT/OT Ongoing, Progressing    Description: Goals to be met by: 10/4     Patient will increase functional independence with ADLs by performing:    UE Dressing while seated EOB with Minimal Assistance.   *limited by nausea  LE Dressing (pants, brief) with Minimal Assistance.  *limited by nausea  Grooming while standing at sink with Minimal Assistance.*limited by weakness, anxiousness/fear  Toileting from toilet with Minimal Assistance for hygiene and clothing management.   *revised: BSC c Mod(A)  Toilet transfer to toilet with Minimal Assistance. Partially met - to BSC  Functional mobility at household distance with min(A). Partially met - bedroom distance                     Reasons for Discontinuation of Therapy Services  Transfer to alternate level of care.      Plan:     Patient Discharged to: [facility] TIMA Gannon  10/5/2020

## 2020-10-08 ENCOUNTER — TELEPHONE (OUTPATIENT)
Dept: NEUROLOGY | Facility: CLINIC | Age: 65
End: 2020-10-08

## 2020-10-08 NOTE — TELEPHONE ENCOUNTER
Called pt's number and left vm to return call in regards to scheduling follow up stroke visit. Called son's number not able to leave vm.

## 2021-01-07 NOTE — ASSESSMENT & PLAN NOTE
-Stroke risk factor.    -Goal glucose 140-180 for now.  -Hold home meds for now.  -Moderate correction SSI and QAC/HS accuchecks, titrate prn   Mask in place on arrival.

## 2022-05-20 NOTE — PLAN OF CARE
Patient medically ready for discharge to [facility]. Any necessary transport setup by . This CM scheduled or requested necessary follow-up appointments. Family/patient aware of discharge.    No future appointments.       10/01/20 1159   Final Note   Assessment Type Final Discharge Note   Anticipated Discharge Disposition Rehab   Hospital Follow Up  Appt(s) scheduled? No   Discharge plans and expectations educations in teach back method with documentation complete? Yes   Right Care Referral Info   Post Acute Recommendation IRF   Facility Name [facility]   Post-Acute Status   Post-Acute Authorization Placement   Post-Acute Placement Status Set-up Complete   Discharge Delays None known at this time       Gabrielle Villa RN  Case Management  Ext: [phone]  10/01/2020       No

## 2023-03-01 LAB — BCS RECOMMENDATION EXT: NORMAL

## 2023-03-29 ENCOUNTER — PATIENT OUTREACH (OUTPATIENT)
Dept: ADMINISTRATIVE | Facility: HOSPITAL | Age: 68
End: 2023-03-29
Payer: COMMERCIAL

## 2023-03-29 NOTE — PROGRESS NOTES
Population Health Outreach.Records Received, hyper-linked into chart at this time. The following record(s)  below were uploaded for Health Maintenance .    5/6/2016-colonoscopy  3/1/2023-MAMMOGRAM